# Patient Record
Sex: MALE | Race: WHITE | NOT HISPANIC OR LATINO | Employment: FULL TIME | URBAN - METROPOLITAN AREA
[De-identification: names, ages, dates, MRNs, and addresses within clinical notes are randomized per-mention and may not be internally consistent; named-entity substitution may affect disease eponyms.]

---

## 2017-02-09 ENCOUNTER — OFFICE VISIT - HEALTHEAST (OUTPATIENT)
Dept: INTERNAL MEDICINE | Facility: CLINIC | Age: 52
End: 2017-02-09

## 2017-02-09 DIAGNOSIS — R05.9 COUGH: ICD-10-CM

## 2017-02-09 DIAGNOSIS — Z20.818 PERTUSSIS EXPOSURE: ICD-10-CM

## 2017-03-29 ENCOUNTER — OFFICE VISIT - HEALTHEAST (OUTPATIENT)
Dept: SLEEP MEDICINE | Facility: CLINIC | Age: 52
End: 2017-03-29

## 2017-03-29 DIAGNOSIS — G47.10 HYPERSOMNIA: ICD-10-CM

## 2017-03-29 DIAGNOSIS — G47.33 OSA ON CPAP: ICD-10-CM

## 2017-03-29 ASSESSMENT — MIFFLIN-ST. JEOR: SCORE: 1964.37

## 2017-04-02 ENCOUNTER — COMMUNICATION - HEALTHEAST (OUTPATIENT)
Dept: FAMILY MEDICINE | Facility: CLINIC | Age: 52
End: 2017-04-02

## 2017-04-02 DIAGNOSIS — I42.2 APICAL VARIANT HYPERTROPHIC CARDIOMYOPATHY (H): ICD-10-CM

## 2017-04-06 ENCOUNTER — OFFICE VISIT - HEALTHEAST (OUTPATIENT)
Dept: ALLERGY | Facility: CLINIC | Age: 52
End: 2017-04-06

## 2017-04-06 DIAGNOSIS — R09.81 NASAL CONGESTION: ICD-10-CM

## 2017-06-02 ENCOUNTER — OFFICE VISIT - HEALTHEAST (OUTPATIENT)
Dept: FAMILY MEDICINE | Facility: CLINIC | Age: 52
End: 2017-06-02

## 2017-06-02 DIAGNOSIS — M77.8 RIGHT SHOULDER TENDONITIS: ICD-10-CM

## 2017-06-02 DIAGNOSIS — E66.3 OVERWEIGHT (BMI 25.0-29.9): ICD-10-CM

## 2017-06-02 DIAGNOSIS — I42.2 APICAL VARIANT HYPERTROPHIC CARDIOMYOPATHY (H): ICD-10-CM

## 2017-06-02 DIAGNOSIS — Z00.00 ROUTINE GENERAL MEDICAL EXAMINATION AT A HEALTH CARE FACILITY: ICD-10-CM

## 2017-06-02 DIAGNOSIS — G47.33 OSA (OBSTRUCTIVE SLEEP APNEA): ICD-10-CM

## 2017-06-02 DIAGNOSIS — N52.9 MALE ERECTILE DYSFUNCTION: ICD-10-CM

## 2017-06-02 DIAGNOSIS — E78.5 HYPERLIPIDEMIA: ICD-10-CM

## 2017-06-02 DIAGNOSIS — J06.9 VIRAL UPPER RESPIRATORY ILLNESS: ICD-10-CM

## 2017-06-02 DIAGNOSIS — R74.01 ELEVATED ALT MEASUREMENT: ICD-10-CM

## 2017-06-02 LAB
CHOLEST SERPL-MCNC: 124 MG/DL
FASTING STATUS PATIENT QL REPORTED: YES
HDLC SERPL-MCNC: 44 MG/DL
LDLC SERPL CALC-MCNC: 67 MG/DL
TRIGL SERPL-MCNC: 65 MG/DL

## 2017-06-02 ASSESSMENT — MIFFLIN-ST. JEOR: SCORE: 1828.29

## 2017-06-05 ENCOUNTER — RECORDS - HEALTHEAST (OUTPATIENT)
Dept: ADMINISTRATIVE | Facility: OTHER | Age: 52
End: 2017-06-05

## 2017-09-08 ENCOUNTER — RECORDS - HEALTHEAST (OUTPATIENT)
Dept: ADMINISTRATIVE | Facility: OTHER | Age: 52
End: 2017-09-08

## 2017-09-11 ENCOUNTER — RECORDS - HEALTHEAST (OUTPATIENT)
Dept: ADMINISTRATIVE | Facility: OTHER | Age: 52
End: 2017-09-11

## 2017-09-21 ENCOUNTER — COMMUNICATION - HEALTHEAST (OUTPATIENT)
Dept: FAMILY MEDICINE | Facility: CLINIC | Age: 52
End: 2017-09-21

## 2017-09-26 ENCOUNTER — OFFICE VISIT - HEALTHEAST (OUTPATIENT)
Dept: FAMILY MEDICINE | Facility: CLINIC | Age: 52
End: 2017-09-26

## 2017-09-26 ENCOUNTER — RECORDS - HEALTHEAST (OUTPATIENT)
Dept: GENERAL RADIOLOGY | Facility: CLINIC | Age: 52
End: 2017-09-26

## 2017-09-26 DIAGNOSIS — J98.6 DISORDERS OF DIAPHRAGM: ICD-10-CM

## 2017-09-26 DIAGNOSIS — J98.6 ELEVATED HEMIDIAPHRAGM: ICD-10-CM

## 2017-09-26 DIAGNOSIS — M89.8X1 PAIN OF LEFT SCAPULA: ICD-10-CM

## 2017-09-26 DIAGNOSIS — M89.8X1 OTHER SPECIFIED DISORDERS OF BONE, SHOULDER: ICD-10-CM

## 2017-09-26 DIAGNOSIS — I42.2 APICAL VARIANT HYPERTROPHIC CARDIOMYOPATHY (H): ICD-10-CM

## 2017-09-26 ASSESSMENT — MIFFLIN-ST. JEOR: SCORE: 1720.56

## 2017-10-06 ENCOUNTER — OFFICE VISIT - HEALTHEAST (OUTPATIENT)
Dept: PULMONOLOGY | Facility: OTHER | Age: 52
End: 2017-10-06

## 2017-10-06 DIAGNOSIS — M54.9 MUSCULOSKELETAL BACK PAIN: ICD-10-CM

## 2020-05-11 ENCOUNTER — OFFICE VISIT - HEALTHEAST (OUTPATIENT)
Dept: FAMILY MEDICINE | Facility: CLINIC | Age: 55
End: 2020-05-11

## 2020-05-11 DIAGNOSIS — M10.072 ACUTE IDIOPATHIC GOUT OF LEFT FOOT: ICD-10-CM

## 2020-05-11 RX ORDER — CETIRIZINE HYDROCHLORIDE 10 MG/1
10 TABLET ORAL DAILY
Status: SHIPPED | COMMUNITY
Start: 2020-05-11 | End: 2023-05-11

## 2020-05-26 ENCOUNTER — COMMUNICATION - HEALTHEAST (OUTPATIENT)
Dept: FAMILY MEDICINE | Facility: CLINIC | Age: 55
End: 2020-05-26

## 2020-05-26 ENCOUNTER — AMBULATORY - HEALTHEAST (OUTPATIENT)
Dept: FAMILY MEDICINE | Facility: CLINIC | Age: 55
End: 2020-05-26

## 2020-05-26 DIAGNOSIS — M10.072 ACUTE IDIOPATHIC GOUT OF LEFT FOOT: ICD-10-CM

## 2020-06-02 ENCOUNTER — COMMUNICATION - HEALTHEAST (OUTPATIENT)
Dept: FAMILY MEDICINE | Facility: CLINIC | Age: 55
End: 2020-06-02

## 2020-06-04 ENCOUNTER — OFFICE VISIT - HEALTHEAST (OUTPATIENT)
Dept: FAMILY MEDICINE | Facility: CLINIC | Age: 55
End: 2020-06-04

## 2020-06-04 ENCOUNTER — COMMUNICATION - HEALTHEAST (OUTPATIENT)
Dept: FAMILY MEDICINE | Facility: CLINIC | Age: 55
End: 2020-06-04

## 2020-06-04 DIAGNOSIS — M79.89 SWELLING OF TOE OF LEFT FOOT: ICD-10-CM

## 2020-06-04 DIAGNOSIS — M70.41 PREPATELLAR BURSITIS OF RIGHT KNEE: ICD-10-CM

## 2020-06-04 DIAGNOSIS — Z23 NEED FOR VACCINATION: ICD-10-CM

## 2020-06-04 LAB
APPEARANCE FLD: NORMAL
COLOR FLD: NORMAL
CRYSTALS SNV MICRO: NORMAL
CRYSTALS SNV MICRO: NORMAL
ERYTHROCYTE [DISTWIDTH] IN BLOOD BY AUTOMATED COUNT: 13 % (ref 11–14.5)
HCT VFR BLD AUTO: 47.1 % (ref 40–54)
HGB BLD-MCNC: 15.9 G/DL (ref 14–18)
MCH RBC QN AUTO: 30.5 PG (ref 27–34)
MCHC RBC AUTO-ENTMCNC: 33.7 G/DL (ref 32–36)
MCV RBC AUTO: 90 FL (ref 80–100)
PLATELET # BLD AUTO: 130 THOU/UL (ref 140–440)
PMV BLD AUTO: 8.3 FL (ref 7–10)
RBC # BLD AUTO: 5.2 MILL/UL (ref 4.4–6.2)
RBC FLUID - HISTORICAL: NORMAL
URATE SERPL-MCNC: 7.5 MG/DL (ref 3–8)
WBC # FLD AUTO: NORMAL 10*3/UL
WBC: 5.8 THOU/UL (ref 4–11)

## 2020-06-04 RX ORDER — COLCHICINE 0.6 MG/1
0.6 TABLET ORAL 2 TIMES DAILY
Qty: 60 TABLET | Refills: 2 | Status: SHIPPED | OUTPATIENT
Start: 2020-06-04 | End: 2022-06-27

## 2020-06-07 LAB
BACTERIA SPEC CULT: NO GROWTH
GRAM STAIN RESULT: NORMAL
GRAM STAIN RESULT: NORMAL

## 2020-07-15 ENCOUNTER — OFFICE VISIT - HEALTHEAST (OUTPATIENT)
Dept: FAMILY MEDICINE | Facility: CLINIC | Age: 55
End: 2020-07-15

## 2020-07-15 DIAGNOSIS — B02.9 HERPES ZOSTER WITHOUT COMPLICATION: ICD-10-CM

## 2020-07-15 ASSESSMENT — MIFFLIN-ST. JEOR: SCORE: 1793.14

## 2020-07-20 ENCOUNTER — COMMUNICATION - HEALTHEAST (OUTPATIENT)
Dept: FAMILY MEDICINE | Facility: CLINIC | Age: 55
End: 2020-07-20

## 2020-08-19 ENCOUNTER — OFFICE VISIT - HEALTHEAST (OUTPATIENT)
Dept: FAMILY MEDICINE | Facility: CLINIC | Age: 55
End: 2020-08-19

## 2020-08-19 DIAGNOSIS — G47.33 OSA (OBSTRUCTIVE SLEEP APNEA): ICD-10-CM

## 2020-08-19 DIAGNOSIS — L81.9 ATYPICAL PIGMENTED SKIN LESION: ICD-10-CM

## 2020-08-19 DIAGNOSIS — Z11.4 ENCOUNTER FOR SCREENING FOR HIV: ICD-10-CM

## 2020-08-19 DIAGNOSIS — E78.5 HYPERLIPIDEMIA, UNSPECIFIED HYPERLIPIDEMIA TYPE: ICD-10-CM

## 2020-08-19 DIAGNOSIS — M10.072 ACUTE IDIOPATHIC GOUT OF LEFT FOOT: ICD-10-CM

## 2020-08-19 DIAGNOSIS — R74.01 ELEVATED AST (SGOT): ICD-10-CM

## 2020-08-19 DIAGNOSIS — Z86.19 HISTORY OF HERPES ZOSTER: ICD-10-CM

## 2020-08-19 DIAGNOSIS — Z00.00 ROUTINE GENERAL MEDICAL EXAMINATION AT A HEALTH CARE FACILITY: ICD-10-CM

## 2020-08-19 DIAGNOSIS — E66.3 OVERWEIGHT: ICD-10-CM

## 2020-08-19 DIAGNOSIS — I42.2 APICAL VARIANT HYPERTROPHIC CARDIOMYOPATHY (H): ICD-10-CM

## 2020-08-19 LAB
ALBUMIN SERPL-MCNC: 4.3 G/DL (ref 3.5–5)
ALP SERPL-CCNC: 54 U/L (ref 45–120)
ALT SERPL W P-5'-P-CCNC: 27 U/L (ref 0–45)
ANION GAP SERPL CALCULATED.3IONS-SCNC: 10 MMOL/L (ref 5–18)
AST SERPL W P-5'-P-CCNC: 35 U/L (ref 0–40)
BILIRUB SERPL-MCNC: 0.8 MG/DL (ref 0–1)
BUN SERPL-MCNC: 12 MG/DL (ref 8–22)
CALCIUM SERPL-MCNC: 9.4 MG/DL (ref 8.5–10.5)
CHLORIDE BLD-SCNC: 103 MMOL/L (ref 98–107)
CHOLEST SERPL-MCNC: 188 MG/DL
CO2 SERPL-SCNC: 29 MMOL/L (ref 22–31)
CREAT SERPL-MCNC: 1.18 MG/DL (ref 0.7–1.3)
ERYTHROCYTE [DISTWIDTH] IN BLOOD BY AUTOMATED COUNT: 13.1 % (ref 11–14.5)
FASTING STATUS PATIENT QL REPORTED: YES
GFR SERPL CREATININE-BSD FRML MDRD: >60 ML/MIN/1.73M2
GLUCOSE BLD-MCNC: 85 MG/DL (ref 70–125)
HCT VFR BLD AUTO: 48.5 % (ref 40–54)
HDLC SERPL-MCNC: 51 MG/DL
HGB BLD-MCNC: 16.1 G/DL (ref 14–18)
LDLC SERPL CALC-MCNC: 119 MG/DL
MCH RBC QN AUTO: 30.9 PG (ref 27–34)
MCHC RBC AUTO-ENTMCNC: 33.1 G/DL (ref 32–36)
MCV RBC AUTO: 93 FL (ref 80–100)
PLATELET # BLD AUTO: 133 THOU/UL (ref 140–440)
PMV BLD AUTO: 10.1 FL (ref 7–10)
POTASSIUM BLD-SCNC: 4.5 MMOL/L (ref 3.5–5)
PROT SERPL-MCNC: 6.4 G/DL (ref 6–8)
RBC # BLD AUTO: 5.21 MILL/UL (ref 4.4–6.2)
SODIUM SERPL-SCNC: 142 MMOL/L (ref 136–145)
TRIGL SERPL-MCNC: 91 MG/DL
URATE SERPL-MCNC: 8.8 MG/DL (ref 3–8)
WBC: 4 THOU/UL (ref 4–11)

## 2020-08-19 ASSESSMENT — MIFFLIN-ST. JEOR: SCORE: 1810.15

## 2020-08-20 LAB — HIV 1+2 AB+HIV1 P24 AG SERPL QL IA: NEGATIVE

## 2020-08-24 ENCOUNTER — COMMUNICATION - HEALTHEAST (OUTPATIENT)
Dept: FAMILY MEDICINE | Facility: CLINIC | Age: 55
End: 2020-08-24

## 2020-10-29 ENCOUNTER — AMBULATORY - HEALTHEAST (OUTPATIENT)
Dept: FAMILY MEDICINE | Facility: CLINIC | Age: 55
End: 2020-10-29

## 2020-10-29 ENCOUNTER — VIRTUAL VISIT (OUTPATIENT)
Dept: FAMILY MEDICINE | Facility: OTHER | Age: 55
End: 2020-10-29

## 2020-10-29 DIAGNOSIS — Z20.822 SUSPECTED 2019 NOVEL CORONAVIRUS INFECTION: ICD-10-CM

## 2020-10-29 NOTE — PROGRESS NOTES
"Date: 10/29/2020 09:28:28  Clinician: Angélica Angel  Clinician NPI: [id number]  Patient: Gagan Mejía  Patient : 1965  Patient Address: [address] [place] [address]  Patient Phone: [phone]  Visit Protocol: URI  Patient Summary:  Gagan is a 55 year old ( : 1965 ) male who initiated a [facility] Visit for COVID-19 (Coronavirus) evaluation and screening. When asked the question \"Please sign me up to receive news, health information and promotions. \", Gagan responded \"No\".    Gagan states his symptoms started 1-2 days ago.   His symptoms consist of a headache, a cough, nasal congestion, myalgia, malaise, a sore throat, and rhinitis.   Symptom details     Nasal secretions: The color of his mucus is clear.    Cough: Gagan coughs a few times an hour and his cough is not more bothersome at night. Phlegm does not come into his throat when he coughs. He believes his cough is caused by post-nasal drip.     Sore throat: Gagan reports having mild throat pain (1-3 on a 10 point pain scale), does not have exudate on his tonsils, and can swallow liquids. He is not sure if the lymph nodes in his neck are enlarged. A rash has not appeared on the skin since the sore throat started.     Headache: He states the headache is mild (1-3 on a 10 point pain scale).      Gagan denies having ear pain, wheezing, fever, nausea, facial pain or pressure, chills, teeth pain, ageusia, diarrhea, anosmia, and vomiting. He also denies having recent facial or sinus surgery in the past 60 days and taking antibiotic medication in the past month. He is not experiencing dyspnea.   Precipitating events  Gagan is not sure if he has been exposed to someone with strep throat. He has not recently been exposed to someone with influenza. Gagan has not been in close contact with any high risk individuals.   Pertinent COVID-19 (Coronavirus) information  Gagan does not work or volunteer as healthcare worker or a first " responder. In the past 14 days, Gagan has not worked or volunteered at a healthcare facility or group living setting.   In the past 14 days, he also has not lived in a congregate living setting.   Gagan has not had a close contact with a laboratory-confirmed COVID-19 patient within 14 days of symptom onset.    Since December 2019, Gagan has not been diagnosed with lab-confirmed COVID-19 test and has not had upper respiratory infection or influenza-like illness.   Pertinent medical history  Gagan does not need a return to work/school note.   Weight: 205 lbs   Gagan does not smoke or use smokeless tobacco.   Weight: 205 lbs    MEDICATIONS: No current medications, ALLERGIES: Penicillins  Clinician Response:  Dear Gagan,   Your symptoms show that you may have coronavirus (COVID-19). This illness can cause fever, cough and trouble breathing. Many people get a mild case and get better on their own. Some people can get very sick.  What should I do?  We would like to test you for this virus.   1. Please call [phone] to schedule your visit. Explain that you were referred by [facility] to have a COVID-19 test. Be ready to share your [facility] visit ID number.  The following will serve as your written order for this COVID Test, ordered by me, for the indication of suspected COVID [Z20.828]: The test will be ordered in [employer], our electronic health record, after you are scheduled. It will show as ordered and authorized by Jonny Huggins MD.  Order: COVID-19 (Coronavirus) PCR for SYMPTOMATIC testing from [facility].   2. When it's time for your COVID test:  Stay at least 6 feet away from others. (If someone will drive you to your test, stay in the backseat, as far away from the  as you can.)   Cover your mouth and nose with a mask, tissue or washcloth.  Go straight to the testing site. Don't make any stops on the way there or back.      3.Starting now: Stay home and away from others (self-isolate) until:   You've had no  "fever---and no medicine that reduces fever---for one full day (24 hours). And...   Your other symptoms have gotten better. For example, your cough or breathing has improved. And...   At least 10 days have passed since your symptoms started.       During this time, don't leave the house except for testing or medical care.   Stay in your own room, even for meals. Use your own bathroom if you can.   Stay away from others in your home. No hugging, kissing or shaking hands. No visitors.  Don't go to work, school or anywhere else.    Clean \"high touch\" surfaces often (doorknobs, counters, handles, etc.). Use a household cleaning spray or wipes. You'll find a full list of  on the EPA website: www.epa.gov/pesticide-registration/list-n-disinfectants-use-against-sars-cov-2.   Cover your mouth and nose with a mask, tissue or washcloth to avoid spreading germs.  Wash your hands and face often. Use soap and water.  Caregivers in these groups are at risk for severe illness due to COVID-19:  o People 65 years and older  o People who live in a nursing home or long-term care facility  o People with chronic disease (lung, heart, cancer, diabetes, kidney, liver, immunologic)  o People who have a weakened immune system, including those who:   Are in cancer treatment  Take medicine that weakens the immune system, such as corticosteroids  Had a bone marrow or organ transplant  Have an immune deficiency  Have poorly controlled HIV or AIDS  Are obese (body mass index of 40 or higher)  Smoke regularly   o Caregivers should wear gloves while washing dishes, handling laundry and cleaning bedrooms and bathrooms.  o Use caution when washing and drying laundry: Don't shake dirty laundry, and use the warmest water setting that you can.  o For more tips, go to www.cdc.gov/coronavirus/2019-ncov/downloads/10Things.pdf.    4.Sign up for [employer] Loop. We know it's scary to hear that you might have COVID-19. We want to track your symptoms to " make sure you're okay over the next 2 weeks. Please look for an email from [employer]---this is a free, online program that we'll use to keep in touch. To sign up, follow the link in the email. Learn more at [email or web address]  How can I take care of myself?   Get lots of rest. Drink extra fluids (unless a doctor has told you not to).   Take Tylenol (acetaminophen) for fever or pain. If you have liver or kidney problems, ask your family doctor if it's okay to take Tylenol.   Adults can take either:    650 mg (two 325 mg pills) every 4 to 6 hours, or...   1,000 mg (two 500 mg pills) every 8 hours as needed.    Note: Don't take more than 3,000 mg in one day. Acetaminophen is found in many medicines (both prescribed and over-the-counter medicines). Read all labels to be sure you don't take too much.   For children, check the Tylenol bottle for the right dose. The dose is based on the child's age or weight.    If you have other health problems (like cancer, heart failure, an organ transplant or severe kidney disease): Call your specialty clinic if you don't feel better in the next 2 days.       Know when to call 911. Emergency warning signs include:    Trouble breathing or shortness of breath Pain or pressure in the chest that doesn't go away Feeling confused like you haven't felt before, or not being able to wake up Bluish-colored lips or face.  Where can I get more information?   [facility] -- About COVID-19: [email or web address]   CDC -- What to Do If You're Sick: www.cdc.gov/coronavirus/2019-ncov/about/steps-when-sick.html   CDC -- Ending Home Isolation: www.cdc.gov/coronavirus/2019-ncov/hcp/disposition-in-home-patients.html   CDC -- Caring for Someone: www.cdc.gov/coronavirus/2019-ncov/if-you-are-sick/care-for-someone.html   [facility] -- Interim Guidance for Hospital Discharge to Home: www.health.[facility].[place]/diseases/coronavirus/hcp/hospdischarge.pdf   [facility]  trials (COVID-19 research studies): clinicalaffairs.[facility]/n-clinical-trials    Below are the COVID-19 hotlines at the [place] Department of Health ([facility]). Interpreters are available.    For health questions: Call 422-652-7772 or 1-452.125.6107 (7 a.m. to 7 p.m.) For questions about schools and childcare: Call 099-078-0492 or 1-346.744.5859 (7 a.m. to 7 p.m.)    Diagnosis: Contact with and (suspected) exposure to other viral communicable diseases  Diagnosis ICD: Z20.828

## 2020-10-30 ENCOUNTER — COMMUNICATION - HEALTHEAST (OUTPATIENT)
Dept: SCHEDULING | Facility: CLINIC | Age: 55
End: 2020-10-30

## 2020-10-31 ENCOUNTER — NURSE TRIAGE (OUTPATIENT)
Dept: NURSING | Facility: CLINIC | Age: 55
End: 2020-10-31

## 2020-10-31 ENCOUNTER — COMMUNICATION - HEALTHEAST (OUTPATIENT)
Dept: LAB | Facility: CLINIC | Age: 55
End: 2020-10-31

## 2020-10-31 NOTE — TELEPHONE ENCOUNTER
"Coronavirus (COVID-19) Notification    Caller Name (Patient, parent, daughter/son, grandparent, etc)  Patient-Beto    Reason for call  Notify of Positive Coronavirus (COVID-19) lab results, assess symptoms,  review [facility] recommendations    Lab Result    Lab test:  2019-nCoV rRt-PCR or SARS-CoV-2 PCR    Oropharyngeal AND/OR nasopharyngeal swabs is POSITIVE for 2019-nCoV RNA/SARS-COV-2 PCR (COVID-19 virus)    RN Recommendations/Instructions per [facility] Coronavirus COVID-19 recommendations    Brief introduction script  Introduce self then review script:  \"I am calling on behalf of [employer].  We were notified that your Coronavirus test (COVID-19) for was POSITIVE for the virus.  I have some information to relay to you but first I wanted to mention that the [place] Dept of Health will be contacting you shortly [it's possible MD already called Patient] to talk to you more about how you are feeling and other people you have had contact with who might now also have the virus.  Also, [facility] is Partnering with the [facility] for Covid-19 research, you may be contacted directly by research staff.\"    Assessment (Inquire about Patient's current symptoms)   Assessment   Current Symptoms at time of phone call: (if no symptoms, document No symptoms] Mild cough and loss of taste, fatigue, headache   Symptoms onset (if applicable) 10/27     If at time of call, Patients symptoms hare worsened, the Patient should contact 911 or have someone drive them to Emergency Dept promptly:      If Patient calling 911, inform 911 personal that you have tested positive for the Coronavirus (COVID-19).  Place mask on and await 911 to arrive.    If Emergency Dept, If possible, please have another adult drive you to the Emergency Dept but you need to wear mask when in contact with other people.      Review information with Patient    Your result was positive. This means you have COVID-19 (coronavirus).  We " have sent you a letter that reviews the information that I'll be reviewing with you now.    How can I protect others?    If you have symptoms: stay home and away from others (self-isolate) until:    You've had no fever--and no medicine that reduces fever--for 1 full day (24 hours). And       Your other symptoms have gotten better. For example, your cough or breathing has improved. And     At least 10 days have passed since your symptoms started. (If you've been told by a doctor that you have a weak immune system, wait 20 days.)     If you don't have symptoms: Stay home and away from others (self-isolate) until at least 10 days have passed since your first positive COVID-19 test. (Date test collected)    During this time:    Stay in your own room, including for meals. Use your own bathroom if you can.    Stay away from others in your home. No hugging, kissing or shaking hands. No visitors.     Don't go to work, school or anywhere else.     Clean  high touch  surfaces often (doorknobs, counters, handles, etc.). Use a household cleaning spray or wipes. You'll find a full list on the EPA website at www.epa.gov/pesticide-registration/list-n-disinfectants-use-against-sars-cov-2.     Cover your mouth and nose with a mask, tissue or other face covering to avoid spreading germs.    Wash your hands and face often with soap and water.    Caregivers in these groups are at risk for severe illness due to COVID-19:  o People 65 years and older  o People who live in a nursing home or long-term care facility  o People with chronic disease (lung, heart, cancer, diabetes, kidney, liver, immunologic)  o People who have a weakened immune system, including those who:  - Are in cancer treatment  - Take medicine that weakens the immune system, such as corticosteroids  - Had a bone marrow or organ transplant  - Have an immune deficiency  - Have poorly controlled HIV or AIDS  - Are obese (body mass index of 40 or higher)  - Smoke  regularly    Caregivers should wear gloves while washing dishes, handling laundry and cleaning bedrooms and bathrooms.    Wash and dry laundry with special caution. Don't shake dirty laundry, and use the warmest water setting you can.    If you have a weakened immune system, ask your doctor about other actions you should take.    For more tips, go to www.cdc.gov/coronavirus/2019-ncov/downloads/10Things.pdf.    You should not go back to work until you meet the guidelines above for ending your home isolation. You don't need to be retested for COVID-19 before going back to work--studies show that you won't spread the virus if it's been at least 10 days since your symptoms started (or 20 days, if you have a weak immune system).    Employers: This document serves as formal notice of your employee's medical guidelines for going back to work. They must meet the above guidelines before going back to work in person.    How can I take care of myself?    1. Get lots of rest. Drink extra fluids (unless a doctor has told you not to).    2. Take Tylenol (acetaminophen) for fever or pain. If you have liver or kidney problems, ask your family doctor if it's okay to take Tylenol.     Take either:     650 mg (two 325 mg pills) every 4 to 6 hours, or     1,000 mg (two 500 mg pills) every 8 hours as needed.     Note: Don't take more than 3,000 mg in one day. Acetaminophen is found in many medicines (both prescribed and over-the-counter medicines). Read all labels to be sure you don't take too much.    For children, check the Tylenol bottle for the right dose (based on their age or weight).    3. If you have other health problems (like cancer, heart failure, an organ transplant or severe kidney disease): Call your specialty clinic if you don't feel better in the next 2 days.    4. Know when to call 911: Emergency warning signs include:    Trouble breathing or shortness of breath    Pain or pressure in the chest that doesn't go  away    Feeling confused like you haven't felt before, or not being able to wake up    Bluish-colored lips or face    5. Sign up for [employer]. We know it's scary to hear that you have COVID-19. We want to track your symptoms to make sure you're okay over the next 2 weeks. Please look for an email from [employer]--this is a free, online program that we'll use to keep in touch. To sign up, follow the link in the email. Learn more at [email or web address].    Where can I get more information?    [facility]: [email or web address]    Coronavirus Basics: www.health.[facility].[place]./diseases/coronavirus/basics.html    What to Do If You're Sick: www.cdc.gov/coronavirus/2019-ncov/about/steps-when-sick.html    Ending Home Isolation: www.cdc.gov/coronavirus/2019-ncov/hcp/disposition-in-home-patients.html     Caring for Someone with COVID-19: www.cdc.gov/coronavirus/2019-ncov/if-you-are-sick/care-for-someone.html     [facility] clinical trials (COVID-19 research studies): clinicalaffairs.[facility]/n-clinical-trials     A Positive COVID-19 letter will be sent via [employer] or the mail. (Exception, no letters sent to Presurgerical/Preprocedure Patients)    [Name]  Leyda Richey RN

## 2021-03-25 ENCOUNTER — AMBULATORY - HEALTHEAST (OUTPATIENT)
Dept: NURSING | Facility: CLINIC | Age: 56
End: 2021-03-25

## 2021-04-19 ENCOUNTER — AMBULATORY - HEALTHEAST (OUTPATIENT)
Dept: NURSING | Facility: CLINIC | Age: 56
End: 2021-04-19

## 2021-05-29 ENCOUNTER — RECORDS - HEALTHEAST (OUTPATIENT)
Dept: ADMINISTRATIVE | Facility: CLINIC | Age: 56
End: 2021-05-29

## 2021-05-30 VITALS — HEIGHT: 71 IN | WEIGHT: 243 LBS | BODY MASS INDEX: 34.02 KG/M2

## 2021-05-30 VITALS — BODY MASS INDEX: 33.11 KG/M2 | WEIGHT: 237.4 LBS

## 2021-05-31 VITALS — HEIGHT: 71 IN | WEIGHT: 191 LBS | BODY MASS INDEX: 26.74 KG/M2

## 2021-05-31 VITALS — WEIGHT: 189 LBS | BODY MASS INDEX: 26.74 KG/M2

## 2021-05-31 VITALS — WEIGHT: 213 LBS | HEIGHT: 71 IN | BODY MASS INDEX: 29.82 KG/M2

## 2021-06-02 ENCOUNTER — RECORDS - HEALTHEAST (OUTPATIENT)
Dept: ADMINISTRATIVE | Facility: CLINIC | Age: 56
End: 2021-06-02

## 2021-06-04 VITALS
HEART RATE: 64 BPM | SYSTOLIC BLOOD PRESSURE: 150 MMHG | BODY MASS INDEX: 28.98 KG/M2 | HEIGHT: 71 IN | RESPIRATION RATE: 12 BRPM | DIASTOLIC BLOOD PRESSURE: 79 MMHG | TEMPERATURE: 98.1 F | WEIGHT: 207 LBS

## 2021-06-04 VITALS
SYSTOLIC BLOOD PRESSURE: 110 MMHG | BODY MASS INDEX: 29.26 KG/M2 | HEART RATE: 69 BPM | HEIGHT: 71 IN | WEIGHT: 209 LBS | OXYGEN SATURATION: 98 % | TEMPERATURE: 96.5 F | DIASTOLIC BLOOD PRESSURE: 80 MMHG

## 2021-06-04 VITALS
TEMPERATURE: 98 F | DIASTOLIC BLOOD PRESSURE: 70 MMHG | OXYGEN SATURATION: 98 % | SYSTOLIC BLOOD PRESSURE: 110 MMHG | BODY MASS INDEX: 28.29 KG/M2 | WEIGHT: 200 LBS

## 2021-06-08 NOTE — PROGRESS NOTES
[facility] Clinic Visit  Patient Name: Gagan Mejía  Patient Age: 51 y.o.  YOB: 1965  MRN: [id number]  ?  Date of Visit: 2/9/2017  Reason for Office Visit:   Chief Complaint   Patient presents with     Cough     mild cough this am, exposed to whooping cough     runny nose     and sneezing for last 2 days, afebrile- felt warm on tuesday       HPI: Gagan Mejía 51 y.o. who presents to clinic for mild dry cough, sneezing, rhinitis. Tactile fever the other day but did not record a temp. Started with vitamin C, no other OTC medications. No SOB, chest pain, SABILLON.        Review of Systems: As noted in HPI     Current Scheduled Meds:  Outpatient Encounter Prescriptions as of 2/9/2017   Medication Sig Dispense Refill     cetirizine (ZYRTEC) 10 MG tablet Take 10 mg by mouth daily.       metoprolol tartrate (LOPRESSOR) 50 MG tablet TAKE 1 TABLET(50 MG) BY MOUTH TWICE DAILY 180 tablet 1     multivitamin (ONE A DAY) per tablet Take 1 tablet by mouth daily.       azithromycin (ZITHROMAX Z-ESTIVEN) 250 MG tablet Take 2 tablets (500 mg) on  Day 1,  followed by 1 tablet (250 mg) once daily on Days 2 through 5. 6 tablet 0     No facility-administered encounter medications on file as of 2/9/2017.      Past Medical History:   Diagnosis Date     Apical variant hypertrophic cardiomyopathy 4/3/2016     JENNIFER on CPAP, mild to moderate per patient 11/2015     Rotator cuff tear, right      Past Surgical History:   Procedure Laterality Date     KNEE ARTHROSCOPY       LASIK       ROTATOR CUFF REPAIR Right      Social History   Substance Use Topics     Smoking status: Never Smoker     Smokeless tobacco: Never Used     Alcohol use Yes      Comment: 1-2 times per week       Objective / Physical Examination:  Visit Vitals     /64     Pulse 64     Temp 97.4  F (36.3  C) (Oral)     Wt (!) 237 lb 6.4 oz (107.7 kg)     SpO2 97%     BMI 33.11 kg/m2     Wt Readings from Last 3 Encounters:   02/09/17 (!) 237 lb 6.4 oz (107.7 kg)    10/19/16 (!) 240 lb (108.9 kg)   07/21/16 (!) 235 lb (106.6 kg)     Body mass index is 33.11 kg/(m^2). (>25?)    General Appearance: Alert and oriented in no acute distress  Neck: Supple, trachea midline. No cervical adenopathy.  Lungs: Clear to auscultation bilaterally. Normal inspiratory and expiratory effort. No w/r/r  Cardiovascular: RRR S1, S2. No m/r/g  Integumentary: Warm and dry  Neuro: Alert and oriented, follows commands appropriately    Assessment / Plan / Medical Decision Making:      Encounter Diagnoses   Name Primary?     Cough Yes     Pertussis exposure         1. Cough  2. Pertussis exposure    rx for post exposure prophylaxis which is same as treatment for pertussis. Recommend he stay home from work tomorrow and may return Monday if feeling ok. Continue to wear a mask and avoid close contact with family members for next 4-5 days. Return if symptoms not improving or worsening    - azithromycin (ZITHROMAX Z-ESTIVEN) 250 MG tablet; Take 2 tablets (500 mg) on  Day 1,  followed by 1 tablet (250 mg) once daily on Days 2 through 5.  Dispense: 6 tablet; Refill: 0    Kiran Nur MD  [facility]

## 2021-06-08 NOTE — PROGRESS NOTES
Left great toe IP Joint Aspiration/Injection    Date/Time: 6/4/2020 10:50 AM  Performed by: Luciano Nicholas MD  Authorized by: Luciano Nicholas MD       Universal Protocol    Site marked: Yes    Prior images obtained and reviewed: Yes    Required items: required blood products, implants, devices, and special equipment available    Patient identity confirmed: verbally with patient    Reevaluation: Patient was reevaluated immediately before administering moderate or deep sedation or anesthesia    Confirmation checklist: patient's identity using two indicators    Time out: Immediately prior to procedure a time out was called to verify the correct patient, procedure, equipment, support staff and site/side marked as required    Universal Protocol: Joint Commission Universal Protocol was followed    Preparation: Patient was prepped and draped in the usual sterile fashion    Indications  Indications: joint swelling     Location  Body area: toe  Joint: left big IP      Anesthesia    Local anesthesia used?: Yes    Anesthesia: local infiltration    Local anesthetic: lidocaine 1% without epinephrine    Anesthetic total (mL): 1    Sedation  Patient sedation: No    Procedure Details  Needle size: 22 G  Ultrasound guidance: no  Approach: anterior  Aspirate amount: 0.5 mL  Methylprednisolone amount: 20 mg      Post-procedure    Patient tolerance: Patient tolerated the procedure well with no immediate complications   Length of time physician present for 1:1 monitoring during sedation: 0

## 2021-06-08 NOTE — PROGRESS NOTES
"Gagna Mejía is a 55 y.o. male who is being evaluated via a billable video visit.      The patient has been notified of following:     \"This video visit will be conducted via a call between you and your physician/provider. We have found that certain health care needs can be provided without the need for an in-person physical exam.  This service lets us provide the care you need with a video conversation.  If a prescription is necessary we can send it directly to your pharmacy.  If lab work is needed we can place an order for that and you can then stop by our lab to have the test done at a later time.    Video visits are billed at different rates depending on your insurance coverage. Please reach out to your insurance provider with any questions.    If during the course of the call the physician/provider feels a video visit is not appropriate, you will not be charged for this service.\"    Patient has given verbal consent to a Video visit? Yes    Patient would like to receive their AVS by AVS Preference: Kenny.    Patient would like the video invitation sent by: Text to cell phone: [phone]    Will anyone else be joining your video visit? 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 Rossi); right shoulder arthroscopy 3/31/16 (Dr. Bales) for subacromial decompression and partial thickness tear repair of supraspinatus)  Pneumonia LLL - 2005     CPAP (variable) ~ 8 (starts at 5 and goes up to 8-10)    Dr. Dorantes ([place]), cardiologist  Dr. Bales, [facility]    Video Start Time: 4:01 PM    Additional provider notes: GENERAL: Healthy, alert and no distress  EYES: Eyes grossly normal to inspection. No " discharge or erythema, or obvious scleral/conjunctival abnormalities.  RESP: No audible wheeze, cough, or visible cyanosis.  No visible retractions or increased work of breathing.    NEURO: Cranial nerves grossly intact. Mentation and speech appropriate for age.  PSYCH: Mentation appears normal, affect normal/bright, judgement and insight intact, normal speech and appearance well-groomed  left great toe IP joint inflammation and redness without drainage    Video visit completed today.  Left great toe pain.  Noted last Thursday.  Involving the IP joint of great toe.  No injury or trauma.  No history of gout previously or chronic gout concerns.  Brother however has gout.  Neighbor also.  Patient did get 3 prednisone tablets from his neighbor.  Took 2 initially then 1 the following day.  Felt better.  Symptoms however lingering.  No significant dietary indiscretions regarding red meat, high fructose corn syrup, seafood, alcohol consumption etc.  Overdue for physical exam which was last performed June 2, 2017.  Patient without concerns for side effects with prednisone including insomnia, increased appetite etc.    1. Acute idiopathic gout of left foot  Idiopathic gout left foot IP joint involvement.  Prednisone 20 mg twice daily x5 days.  Will check uric acid level, CBC and renal function at follow-up physical exam the summer with uric acid goal less than 6.0 ideally.  Gout diet to be followed.  - predniSONE (DELTASONE) 20 MG tablet; Take 20 mg by mouth 2 (two) times a day for 5 days.  Dispense: 10 tablet; Refill: 2       Video-Visit Details    Type of service:  Video Visit    Video End Time (time video stopped): 4:13 PM  Originating Location (pt. Location): Home    Distant Location (provider location):  [facility]     Platform used for Video Visit: Annette Nicholas MD

## 2021-06-08 NOTE — PROGRESS NOTES
"Assessment/Plan:    1. Swelling of toe of left foot  Left great toe IP joint swelling.  Joint aspiration consistent with acute gouty arthritis with thick white fluid drainage.  Sent for exam.  CBC and uric acid level obtained.  Will remain off prednisone.  Colchicine 0.6 mg twice daily.  - HM2(CBC w/o Differential)  - Uric Acid  - HML Joint Fluid Exam  - Culture/Gram Stain: Joint  - Joint Fluid Exam  - Glucose, Body Fluid  - Protein, Body Fluid  - methylPREDNISolone acetate injection 20 mg (DEPO-MEDROL)  - colchicine 0.6 mg tablet; Take 1 tablet (0.6 mg total) by mouth 2 (two) times a day.  Dispense: 60 tablet; Refill: 2    2.  Right knee prepatellar bursitis  Limits of exacerbating triggers.  Avoid kneeling activities.  Anticipate self-limited.    3.  Immunizations  History of Adacel booster July 2, 2009.  tetanus booster provided.      Subjective:    Gagan Mejía is seen today for left great toe pain.  Swelling.  Symptoms since around May 7, 2020.  Had margaritas on May 5.  Had shrimp on May 6.  Drinks bloody [employer].  Occasional beer but less recently.  Was sitting at his desk for about 16 hours around that time.  Brother Dimitri has history of gout.  No fevers.  No trauma.  Right knee pain at times when he kneels otherwise unable to reproduce with direct pressure otherwise.  Needs a tetanus booster.  Comprehensive review of systems as above otherwise all negative.     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 Rossi); right shoulder arthroscopy 3/31/16 (Dr. Bales) for subacromial decompression and partial thickness tear repair of supraspinatus)  Pneumonia LLL - 2005     CPAP (variable) ~ 8 (starts at 5 and goes " up to 8-10)    Dr. Dorantes ([place]), cardiologist  Dr. Bales, [facility]    Past Surgical History:   Procedure Laterality Date     KNEE ARTHROSCOPY       LASIK       ROTATOR CUFF REPAIR Right         Family History   Problem Relation Age of Onset     No Medical Problems Mother      Fainting Father      No Medical Problems Son      Sleep apnea Brother      No Medical Problems Brother      No Medical Problems Brother      No Medical Problems Sister      No Medical Problems Sister         Past Medical History:   Diagnosis Date     Apical variant hypertrophic cardiomyopathy (H) 4/3/2016     JENNIFER on CPAP, mild to moderate per patient 11/2015     Rotator cuff tear, right         Social History     Tobacco Use     Smoking status: Never Smoker     Smokeless tobacco: Never Used   Substance Use Topics     Alcohol use: Yes     Comment: 1-2 times per week     Drug use: No        Current Outpatient Medications   Medication Sig Dispense Refill     cetirizine (ZYRTEC) 10 MG tablet Take 10 mg by mouth daily.       multivitamin (ONE A DAY) per tablet Take 1 tablet by mouth daily.       colchicine 0.6 mg tablet Take 1 tablet (0.6 mg total) by mouth 2 (two) times a day. 60 tablet 2     No current facility-administered medications for this visit.           Objective:    Vitals:    06/04/20 1003   BP: 110/70   Temp: 98  F (36.7  C)   SpO2: 98%   Weight: 200 lb (90.7 kg)      Body mass index is 28.29 kg/m .    Alert.  No apparent distress at rest.  Left great toe with IP joint swelling more dorsal aspect with some desquamation of skin a little bit more proximal however not significantly red or hot at this time.  IP joint able to be articulated without significant tenderness.  Please see procedure note for aspiration of joint with Depo-Medrol 20 mg injected following fluid collection for joint fluid analysis.  Patient did get somewhat lightheaded following procedure better with recumbent position and was fine prior to clinic  discharge.      This note has been dictated using voice recognition software and as a result may contain minor grammatical errors and unintended word substitutions.

## 2021-06-09 NOTE — PROGRESS NOTES
Assessment/Plan:     Problem List Items Addressed This Visit     Herpes zoster without complication - Primary     With skin outbreak starting in the past 48 hours, will start antiretroviral along with steroids.  If pain worsens then will look at 25 to 50 mg of Elavil at night for 30 days.  Also recommended patient follow PCP advice and get shingles vaccine but due to current outbreak will need to wait 2 months per package/vaccine recommendations         Relevant Medications    valACYclovir (VALTREX) 1000 MG tablet    predniSONE (DELTASONE) 20 MG tablet        Return in about 2 months (around 9/15/2020) for Annual physical.    Subjective:   55 y.o. male presents for burning rash on back and side.  Patient stated about 2 days ago he started feeling tingling and some discomfort on the upper left side of back that wrapped around towards the flank.  No fevers or chills.  And then yesterday started developed some redness around the same location and today there are some bumps on it.  Hurts to touch the area.  If he leaves it alone he feels okay unless he moves in certain directions and then he gets a electrical-like pain.  Did have chickenpox as a kid.  He was recommended for the shingles vaccine during his physical last year but he declined at that time and was thinking about getting it this year.        Review of Systems   Constitutional: Negative for chills, fatigue and fever.   Eyes: Negative for photophobia and visual disturbance.   Respiratory: Negative for cough, choking, shortness of breath and wheezing.    Cardiovascular: Negative for chest pain.   Gastrointestinal: Negative for abdominal pain.   Skin: Positive for rash.   Neurological: Negative for dizziness, weakness, numbness and headaches.   Psychiatric/Behavioral: Negative for sleep disturbance.        History     Reviewed By Date/Time Sections Reviewed    Arnold Muñoz DO 7/15/2020  4:08 PM Medical, Surgical, Tobacco, Family, Socioeconomic     "Christoph Mcleod , [facility] 7/15/2020  3:51 PM Tobacco           Objective:     Vitals:    07/15/20 1548   BP: 150/79   Pulse: 64   Resp: 12   Temp: 98.1  F (36.7  C)   TempSrc: Oral   Weight: 207 lb (93.9 kg)   Height: 5' 10.5\" (1.791 m)     Physical Exam  Vitals signs reviewed.   Constitutional:       General: He is not in acute distress.     Appearance: Normal appearance.   HENT:      Head: Normocephalic and atraumatic.   Cardiovascular:      Rate and Rhythm: Normal rate and regular rhythm.      Pulses: Normal pulses.      Heart sounds: Normal heart sounds.   Pulmonary:      Effort: Pulmonary effort is normal.      Breath sounds: Normal breath sounds.   Musculoskeletal:      Right lower leg: No edema.      Left lower leg: No edema.   Skin:     Capillary Refill: Capillary refill takes less than 2 seconds.      Comments: Upper left back in the T6/7 dermatome distribution to the left with change in sensation when compared to right.  Also 2 patches of erythema with small fluid-filled vesicles about 2 cm from midline as well as an area in the mid axillary line in the same dermatome   Neurological:      Mental Status: He is alert and oriented to person, place, and time.   Psychiatric:         Mood and Affect: Mood normal.         Thought Content: Thought content normal.         This note has been dictated using voice recognition software. Any grammatical or context distortions are unintentional and inherent to the software      "

## 2021-06-09 NOTE — PROGRESS NOTES
Dear Dr. Luciano Nicholas MD  [address]  [place] [address],    Thank you for the opportunity to participate in the care of Gagan Mejía.     He is a 52 y.o. y/o male patient who comes to the sleep medicine clinic for follow up.    He was diagnosed with mild JENNIFER (AHI=12)  and has been using CPAP in auto mode.    Current pressures 5 to 15 cwp.    His symptoms are improved since he started using CPAP. He is not snoring with the device. He denies any PAP intolerance. He is using the device every night and tolerates the pressure well.     Residual AHI: 0.7  Leak: 0  Compliance: 97%  95th P: 9.9    Mask Tolerance: excellent (P10)  Skin irritation: no    His ESS is 16 today. The patient thinks that some his sleepiness is related to short sleep hours. The patient is averaging 6.5 hours of sleep every night.    Past Medical History:   Diagnosis Date     Apical variant hypertrophic cardiomyopathy 4/3/2016     JENNIFER on CPAP, mild to moderate per patient 11/2015     Rotator cuff tear, right        Past Surgical History:   Procedure Laterality Date     KNEE ARTHROSCOPY       LASIK       ROTATOR CUFF REPAIR Right        Social History     Social History     Marital status:      Spouse name: N/A     Number of children: 1     Years of education: N/A     Occupational History           Social History Main Topics     Smoking status: Never Smoker     Smokeless tobacco: Never Used     Alcohol use Yes      Comment: 1-2 times per week     Drug use: No     Sexual activity: Not on file     Other Topics Concern     Not on file     Social History Narrative       Review of Systems:  General: No weight gain, no weight loss  Eyes: No vision changes  ENT: No hearing changes  Cardio: No chest pain, no nocturnal dyspnea  Respiratory: No shortness of breath, no cough  Gastrointestinal: No diarrhea, no constipation  Genitourinary: No excessive urination  Tegumentary: No rashes  Neurological: No seizures, no  "loss of consciousness  Endo: No heat or cold intolerance.    Current Outpatient Prescriptions   Medication Sig Dispense Refill     cetirizine (ZYRTEC) 10 MG tablet Take 10 mg by mouth daily.       metoprolol tartrate (LOPRESSOR) 50 MG tablet TAKE 1 TABLET(50 MG) BY MOUTH TWICE DAILY 180 tablet 1     multivitamin (ONE A DAY) per tablet Take 1 tablet by mouth daily.       No current facility-administered medications for this visit.        Allergies   Allergen Reactions     Penicillins Hives       Physical Exam:  /72 (Patient Site: Right Arm, Patient Position: Sitting, Cuff Size: Adult Large)  Pulse 60  Resp 16  Ht 5' 11\" (1.803 m)  Wt (!) 243 lb (110.2 kg)  SpO2 97%  BMI 33.89 kg/m2  BMI:Body mass index is 33.89 kg/(m^2).   GEN: NAD, obese  Head: Normocephalic.  EYES: PERRLA, EOMI  ENT: Oropharynx is clear, mallampatti class IV airway.   Uvula is not edemtous  Nasal mucosa is pink, with prominent turbinates (right worse than left).  Neurological: Alert, oriented to time, place, and person.  Psych: normal mood, normal affect     Labs/Studies:     I reviewed the efficacy and compliance report from his device. Data summarized on the HPI and the CPAP compliance flow sheet.     Lab Results   Component Value Date    WBC 9.6 03/31/2016    HGB 15.8 03/31/2016    HCT 48.0 03/31/2016    MCV 89 03/31/2016     03/31/2016         Chemistry        Component Value Date/Time     04/03/2016 0604    K 4.2 04/03/2016 0604     04/03/2016 0604    CO2 26 04/03/2016 0604    BUN 14 05/19/2016 1059    CREATININE 1.06 05/19/2016 1059     04/03/2016 0604        Component Value Date/Time    CALCIUM 9.1 05/19/2016 1059    ALKPHOS 71 03/31/2016 1845    AST 37 03/31/2016 1845    ALT 47 (H) 03/31/2016 1845    BILITOT 0.4 03/31/2016 1845          Assessment and Plan:  In summary Gagan Mejía is a 52 y.o. year old male who is here for follow up.    1. Obstructive Sleep Apnea  Compliance and efficacy are " excellent.  I will renew his supplies.   I changed his device to a narrower pressure range (6-10 cwp)  We counseled the patient on the importannce of using his CPAP device every night and the risks of not treating sleep apnea.      2- Hypersomnia.  Recommend considering Allegra for his allergies.  He would benefit from an Allergy consultation.    Patient verbalized understanding of these issues, agrees with the plan and all questions were answered today. Patient was given an opportuntity to voice any other symptoms or concerns not listed above. Patient did not have any other symptoms or concerns.      Patient told to return in 12-24 months. Patient instructed to stop at  to schedule appointment before leaving today.    Frederick Yoo MD  [facility] Board Certified in Internal Medicine and Sleep Medicine  [facility].    We spent a total of 25 minutes of face-to-face encounter and more than 50% of the encounter was used for counseling or coordination of care.

## 2021-06-09 NOTE — PROGRESS NOTES
Chief complaint: Allergy testing    History of present illness: This is a pleasant 52-year-old gentleman who comes to see me for evaluation of allergies.  He states that a couple years ago he was noted to have some nasal congestion and drainage in the fall.  He was seen by an urgent care clinic and told he had allergies.  He has been taking Zyrtec throughout the year to help with this.  He was recently seen by his sleep physician.  He was told that he had a swollen nose and may want to be evaluated for allergies.  Of note, in 2013 he had specific IgE testing to the respiratory disease panel which was negative.  He has no history of asthma.  No cough, wheeze or shortness of breath.  He notes that he has these recurrent upper respiratory tract illnesses during the spring and during the fall.  He does not use nasal sprays or rinses.    Past medical history: Obstructive sleep apnea    Social history: No pets at home, lives in a home that was built in 1988, intermittent carpeting, no mold, non-smoker    Family history: Negative for allergies and asthma    Review of Systems performed as above and the remainder is negative.      Current Outpatient Prescriptions:      metoprolol tartrate (LOPRESSOR) 50 MG tablet, TAKE 1 TABLET(50 MG) BY MOUTH TWICE DAILY, Disp: 180 tablet, Rfl: 0     multivitamin (ONE A DAY) per tablet, Take 1 tablet by mouth daily., Disp: , Rfl:      cetirizine (ZYRTEC) 10 MG tablet, Take 10 mg by mouth daily., Disp: , Rfl:     Allergies   Allergen Reactions     Penicillins Hives       /68  Pulse 63  SpO2 97%  Gen: Pleasant male not in acute distress  HEENT: Eyes no erythema of the bulbar or palpebral conjunctiva, no edema. Ears: TMs well visualized, no effusions. Nose: No congestion, mucosa normal. Mouth: Throat clear, no lip or tongue edema.   Cardiac: Regular rate and rhythm, no murmurs, rubs or gallops  Respiratory: Clear to auscultation bilaterally, no adventitious breath sounds  Lymph: No  supraclavicular or cervical lymphadenopathy  Skin: No rashes or lesions  Psych: Alert and oriented times 3    Last Percutaneous Allergy Test Results  Trees  Alexis, White  1:20 H  (W/F in mm): 0/0 (04/06/17 0815)  Birch Mix 1:20 H (W/F in mm): 0/0 (04/06/17 0815)  Pickaway, Common 1:20 H (W/F in mm): 0/0 (04/06/17 0815)  Elm, American 1:20 H (W/F in mm): 0/0 (04/06/17 0815)  Roderfield, Shagbark 1:20 H (W/F in mm): 0/0 (04/06/17 0815)  Maple, Hard/Sugar 1:20 H (W/F in mm): 0/0 (04/06/17 0815)  Rockingham Mix 1:20 H (W/F in mm): 0/0 (04/06/17 0815)  Wilsons, Red 1:20 H (W/F in mm): 0/0 (04/06/17 0815)  Upper [place], American 1:20 H (W/F in mm): 0/0 (04/06/17 0815)  Melrose Tree 1:20 H (W/F in mm): 0/0 (04/06/17 0815)  Dust Mites  D. Pteronyssinus Mite 30,000 AU/ML H (W/F in mm): 0/0 (04/06/17 0815)  D. Farinae Mite 30,000 AU/ML H (W/F in mm: 0/0 (04/06/17 0815)  Grasses  Grass Mix #4 10,000 BAU/ML H: 0/0 (04/06/17 0815)  Alexander Grass 1:20 H (W/F in mm): 0/0 (04/06/17 0815)  Cockroach  Cockroach Mix 1:10 H (W/F in mm): 0/0 (04/06/17 0815)  Molds/Fungi  Alternaria Tenuis 1:10 H (W/F in mm): 0/0 (04/06/17 0815)  Aspergillus Fumigatus 1:10 H (W/F in mm): 0/0 (04/06/17 0815)  Homodendrum Cladosporioides 1:10 H (W/F in mm): 0/0 (04/06/17 0815)  Penicillin Notatum 1:10 H (W/F in mm): 0/0 (04/06/17 0815)  Epicoccum 1:10 H (W/F in mm): 0/0 (04/06/17 0815)  Weeds  Ragweed, Short 1:20 H (W/F in mm): 0/0 (04/06/17 0815)  Dock, Sorrel 1:20 H (W/F in mm): 0/0 (04/06/17 0815)  Lamb's Quarter 1:20 H (W/F in mm): 0/0 (04/06/17 0815)  Pigweed, Rough Red Root 1:20 H  (W/F in mm): 0/0 (04/06/17 0815)  Plantain, English 1:20 H  (W/F in mm): 0/0 (04/06/17 0815)  Sagebrush, Mugwort 1:20 H  (W/F in mm): 0/0 (04/06/17 0815)  Animal  Cat 10,000 BAU/ML H (W/F in mm): 0/0 (04/06/17 0815)  Dog 1:10 H (W/F in mm): 0/0 (04/06/17 0815)  Controls  Device Type: QUINTIP (04/06/17 0815)  Neg. control: 50% Glycerine/Saline H (W/F in mm): 0/0 (04/06/17 0815)  Pos.  control: Histamine 6mg/ML (W/F in mms): 7/20 (04/06/17 7054)    Impression report and plan:    1.  Nasal congestion    Allergy testing was negative.  I would recommend the use of nasal rinses.  He could consider the addition of fluticasone nasal spray.  I would stop antihistamines given the negative allergy test.  Follow as needed.

## 2021-06-10 NOTE — PROGRESS NOTES
"     Assessment/Plan:     1. Routine general medical examination at a health care facility  Routine healthcare maintenance.  Preventative cares reviewed.  Immunizations reviewed and up-to-date.  Prior colonoscopy September 8, 2017 with hyperplastic polyp otherwise told to repeat at 10-year interval.  Annual physical exams to continue.    2. Overweight  Dietary and exercise modification for weight goal less than 200 pounds initially, less than 195 pounds ideally.    3. JENNIFER (obstructive sleep apnea)  History of JENNIFER on CPAP which continues.  Tolerating well with benefits noted.    4. Apical variant hypertrophic cardiomyopathy (H)  History of apical variant hypertrophic cardiomyopathy.  Seen through [facility] with last visit around 2017.  Did recommend follow-up with cardiologist for ongoing monitoring \"every few years\".    5. Hyperlipidemia, unspecified hyperlipidemia type  Hyperlipidemia.  Check lipid cascade today.  Dietary and exercise modifications reviewed as noted above.  - Lipid Cascade    6. Acute idiopathic gout of left foot  Recent of left great toe gout flare involving IP joint with prior crystal confirmation with fluid aspirate.  Has colchicine available and can use 1.2 mg initially followed by 0.6 mg 1 hour later.  Uric acid for goal less than 6.  Gout diet to be followed.  CBC obtained.  - HM2(CBC w/o Differential)  - Uric Acid    7. Elevated AST (SGOT)  Has had mild AST elevation.  Dietary modifications for management of overweight status with potential hepatic steatosis etiology.  - Comprehensive Metabolic Panel    8. Encounter for screening for HIV  HIV screen, low risk.  - HIV Antigen/Antibody Screening Cascade    9. History of herpes zoster  History of recent herpes zoster, resolved.    10. Atypical pigmented skin lesion  Multiple skin lesions.  Some hyper and hypopigmented lesions noted as well.  Patient to see dermatologist for complete skin exam.  - Ambulatory referral to Dermatology       The " "following high BMI interventions were performed this visit: encouragement to exercise, weight monitoring, weight loss from baseline weight and lifestyle education regarding diet.  Ensure ongoing efforts to achieve weight goal < 200 pounds initially, < 195 pounds ideally.              Subjective:      Gagan Mejía is a 55 y.o. male who presents for an annual exam.  In general doing well.  Recent shingles described.  Better after 5 days of medication.  No residual issues.  CPAP for JENNIFER management.  History of hypertrophic cardiomyopathy and had been seen through [facility] previously and likely seen in 2017 and told to follow-up in a \"couple years\".  Has had mild lipid elevation in the past.  Recent gout flare great toe.  Prior joint aspiration with crystals noted.  Has colchicine available.  Has not used recently.  Mild AST elevation at 52 with prior platelet count 130,000.  Most recent uric acid level 7.5 on June 4, 2020.  Sun exposure over the years.  Family history of skin cancer in patient's brother perhaps involving his nose.  Patient has areas of hypopigmentation on his hands and scrotum.  Comprehensive review of systems as above otherwise all negative.     - \"Lolly\"   S.O. 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 Rossi); right shoulder arthroscopy 3/31/16 (Dr. Bales) for subacromial decompression and partial thickness tear repair of supraspinatus)  Pneumonia LLL - 2005     CPAP (variable) ~ 8 (starts at 5 and goes up to 8-10)    Healthy Habits:   Regular Exercise: Yes  Healthy Diet: Yes  Dental Visits Regularly: Yes  Seat Belt: Yes   Sexually active: Yes  Colonoscopy: Yes and 9/8/17 - hyperplastic polyp (repeat " in 10 years)  Lipid Profile: Yes  Glucose Screen: Yes    Immunization History   Administered Date(s) Administered     DT (pediatric) 05/17/2002     Influenza, Seasonal, Inj PF IIV3 10/14/2009     Influenza, inj, historic,unspecified 10/01/2015     Influenza,seasonal quad, PF, =/> 6months 10/19/2018     Influenza,seasonal, Inj IIV3 10/13/2010, 10/13/2011, 09/25/2012, 10/08/2013     Influenza,seasonal,quad inj =/> 6months 10/19/2016, 10/21/2019     Novel Influenza Q9M1-15, Nasal 11/24/2009     Pneumo Polysac 23-V 10/19/2016     Td, adult adsorbed, PF 06/04/2020     Td,adult,historic,unspecified 05/17/2002     Tdap 07/02/2009     Immunization status: up to date and documented.  Vision Screening:both eyes  Hearing: PASS     Current Outpatient Medications   Medication Sig Dispense Refill     cetirizine (ZYRTEC) 10 MG tablet Take 10 mg by mouth daily.       multivitamin (ONE A DAY) per tablet Take 1 tablet by mouth daily.       colchicine 0.6 mg tablet Take 1 tablet (0.6 mg total) by mouth 2 (two) times a day. 60 tablet 2     No current facility-administered medications for this visit.      Past Medical History:   Diagnosis Date     Apical variant hypertrophic cardiomyopathy (H) 4/3/2016     JENNIFER on CPAP, mild to moderate per patient 11/2015     Rotator cuff tear, right      Past Surgical History:   Procedure Laterality Date     KNEE ARTHROSCOPY       LASIK       ROTATOR CUFF REPAIR Right      Penicillins  Family History   Problem Relation Age of Onset     No Medical Problems Mother      Fainting Father      No Medical Problems Son      Sleep apnea Brother      No Medical Problems Brother      No Medical Problems Brother      No Medical Problems Sister      No Medical Problems Sister      Social History     Socioeconomic History     Marital status:      Spouse name: Not on file     Number of children: 1     Years of education: Not on file     Highest education level: Not on file   Occupational History      "Occupation:    Social Needs     Financial resource strain: Not on file     Food insecurity     Worry: Not on file     Inability: Not on file     Transportation needs     Medical: Not on file     Non-medical: Not on file   Tobacco Use     Smoking status: Never Smoker     Smokeless tobacco: Never Used   Substance and Sexual Activity     Alcohol use: Yes     Comment: 1-2 times per week     Drug use: No     Sexual activity: Not on file   Lifestyle     Physical activity     Days per week: Not on file     Minutes per session: Not on file     Stress: Not on file   Relationships     Social connections     Talks on phone: Not on file     Gets together: Not on file     Attends Voodoo service: Not on file     Active member of club or organization: Not on file     Attends meetings of clubs or organizations: Not on file     Relationship status: Not on file     Intimate partner violence     Fear of current or ex partner: Not on file     Emotionally abused: Not on file     Physically abused: Not on file     Forced sexual activity: Not on file   Other Topics Concern     Not on file   Social History Narrative     Not on file       Review of Systems  Comprehensive ROS: as above, otherwise all negative.           Objective:     /80   Pulse 69   Temp (!) 96.5  F (35.8  C)   Ht 5' 11\" (1.803 m)   Wt 209 lb (94.8 kg)   SpO2 98%   BMI 29.15 kg/m    Body mass index is 29.15 kg/m .    Physical    General Appearance:    Alert, cooperative, no distress, appears stated age.  Tan skin.   Head:    Normocephalic, without obvious abnormality, atraumatic   Eyes:    PERRL, conjunctiva/corneas clear, EOM's intact, fundi     benign, both eyes.  Glasses.        Ears:    Normal TM's and external ear canals, both ears   Nose:   Nares normal, septum midline, mucosa normal, no drainage    or sinus tenderness   Throat:   Lips, mucosa, and tongue normal; teeth and gums normal   Neck:   Supple, symmetrical, trachea midline, no " adenopathy;        thyroid:  No enlargement/tenderness/nodules; no carotid    bruit or JVD   Back:     Symmetric, no curvature, ROM normal, no CVA tenderness   Lungs:     Clear to auscultation bilaterally, respirations unlabored   Chest wall:    No tenderness or deformity   Heart:    Regular rate and rhythm, S1 and S2 normal, no murmur, rub   or gallop   Abdomen:     Soft, non-tender, bowel sounds active all four quadrants,     no masses, no organomegaly.     Genitalia:    Normal male without lesion, discharge or tenderness.  No inguinal hernia noted.     Rectal:    Normal tone.  Prostate normal/symmetric, no masses or tenderness.   Extremities:   Extremities normal, atraumatic, no cyanosis or edema.  Left great toe swelling with IP joint tenderness, mild without significant fluctuance.   Pulses:   2+ and symmetric all extremities   Skin:   Skin color, texture, turgor normal, no rashes.  Hypopigmented lesions of hands and scrotum.   Lymph nodes:   Cervical, supraclavicular, and axillary nodes normal   Neurologic:   CNII-XII intact. Normal strength, sensation and reflexes       throughout                This note has been dictated using voice recognition software and as a result may contain minor grammatical errors and unintended word substitutions.

## 2021-06-11 NOTE — PROGRESS NOTES
Assessment:     1. Routine general medical examination at a [facility] care facility  Ambulatory referral for Colonoscopy   2. Overweight (BMI 25.0-29.9)     3. Apical variant hypertrophic cardiomyopathy  Comprehensive Metabolic Panel    Thyroid Stimulating Hormone (TSH)   4. JENNIFER (obstructive sleep apnea)      on CPAP   5. Male erectile dysfunction  Testosterone, Total and Free    HM2(CBC w/o Differential)   6. Elevated ALT measurement  Comprehensive Metabolic Panel    GGT (Gamma GT)   7. Hyperlipidemia  Lipid Cascade   8. Right shoulder tendonitis     9. Viral upper respiratory illness          Plan:      Routine healthcare maintenance.  Preventative cares reviewed.  Patient will schedule screening colonoscopy.  Immunizations reviewed and up-to-date.  Continue CPAP for JENNIFER management uncertain pressure setting perhaps 8-10 cm of water pressure.  Patient will follow with Dr. Dorantes cardiologist [facility] Monday, June 5, 2017 regarding hypertrophic cardiomyopathy.  Remains asymptomatic.  Completing regular cardiovascular activity with 19 pound weight loss since May 24, 2016 noted.  Annual physical exams to continue.    I have had an Advance Directives discussion with the patient.  The following high BMI interventions were performed this visit: encouragement to exercise, weight monitoring, weight loss from baseline weight and lifestyle education regarding diet .  Weight goal < 200 pounds initially, < 195 pounds ideally.     Discussed recurrent right shoulder pain following prior shoulder arthroscopy with Dr. Deepak Bales March 31, 2016 including partial supraspinatus tendon tear repair with subacromial decompression.  Will avoid exacerbating triggers and consider repeat MRI if ongoing concerns otherwise patient to avoid exacerbating triggers including overhand serving with tennis etc.    Discussed findings of male erectile dysfunction.  Remains off metoprolol without obvious benefit.  Will check free and total  "testosterone levels this morning as well as CBC, TSH and conference metabolic panel without history of significant hypertension, diabetes etc.    Symptomatic treatments for upper respiratory illness discussed.  Notify persistent concerns or worsening through the weekend with consideration for antibiotic if symptoms persist 10-14 days.    Did check liver function tests including GGT regarding history of mild ALT elevation likely secondary to hepatic steatosis.  Ensure restrict alcohol to no more than 2 ounces daily.    Subjective:      Gagan Mejía is a 52 y.o. male who presents for an annual exam.  Has not had screening colonoscopy.  No significant family history of colon cancer.  History of hypertrophic cardiomyopathy followed by cardiologist at [facility] with scheduled follow-up June 5, 2017.  Has weaned from metoprolol tartrate 50 mg twice daily due to concerns previously with erectile difficulties.  Uncertain if significant benefit however not in a relationship currently since Easter.  Continue CPAP for JENNIFER management.  Has lost 19 pounds to dietary and exercise modifications since the spring and hopes to achieve weight less than 200 pounds initially.  Has had recent cold symptoms with nasal congestion without significant cough.  No orthopnea or PND symptoms.  Right shoulder pain.  Worse over past several weeks.  Worse with overhand serving during tennis.  Did have right shoulder arthroscopy March 31, 2016 with partial supraspinatus tear repair as well as subacromial decompression performed by Dr. Cao.  Has had mild ALT elevation of 44.  Might have a couple beer on Wednesday as well as 4-6 on the weekend.  No jaundice.  Normal appetite.  No diarrhea or constipation.     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 Rossi)  Pneumonia LLL - 2005     CPAP (variable) ~ 8 (starts at 5 and goes up to 8-10)    Healthy Habits:   Regular Exercise: Yes and walk, bike, and tennis (has lost 20# in past year)  Healthy Diet: Yes  Dental Visits Regularly: Yes  Seat Belt: Yes   Sexually active: Yes  Colonoscopy: No and will schedule  Lipid Profile: Yes  Glucose Screen: Yes    Immunization History   Administered Date(s) Administered     DT (pediatric) 05/17/2002     Influenza, inj, historic 10/01/2015     Influenza, seasonal,quad inj 36+ mos 10/19/2016     Pneumo Polysac 23-V 10/19/2016     Td, historic 05/17/2002     Tdap 07/02/2009     Immunization status: up to date and documented.  Vision Screening:both eyes  Hearing: PASS     Current Outpatient Prescriptions   Medication Sig Dispense Refill     multivitamin (ONE A DAY) per tablet Take 1 tablet by mouth daily.       metoprolol tartrate (LOPRESSOR) 50 MG tablet TAKE 1 TABLET(50 MG) BY MOUTH TWICE DAILY 180 tablet 0     No current facility-administered medications for this visit.      Past Medical History:   Diagnosis Date     Apical variant hypertrophic cardiomyopathy 4/3/2016     JENNIFER on CPAP, mild to moderate per patient 11/2015     Rotator cuff tear, right      Past Surgical History:   Procedure Laterality Date     KNEE ARTHROSCOPY       LASIK       ROTATOR CUFF REPAIR Right      Penicillins  Family History   Problem Relation Age of Onset     No Medical Problems Mother      Fainting Father      No Medical Problems Son      Sleep apnea Brother      No Medical Problems Brother      No Medical Problems Brother      No Medical Problems Sister      No Medical Problems Sister      Social History     Social History     Marital status:      Spouse name: N/A     Number of children: 1     Years of education: N/A     Occupational History           Social History Main Topics     Smoking status: Never  "Smoker     Smokeless tobacco: Never Used     Alcohol use Yes      Comment: 1-2 times per week     Drug use: No     Sexual activity: Not on file     Other Topics Concern     Not on file     Social History Narrative       Review of Systems  Comprehensive ROS: as above, otherwise all negative.           Objective:     /68  Pulse 66  Resp 16  Ht 5' 11\" (1.803 m)  Wt 213 lb (96.6 kg)  SpO2 97%  BMI 29.71 kg/m2  Body mass index is 29.71 kg/(m^2).    Physical    General Appearance:    Alert, cooperative, no distress, appears stated age.  Overweight.   Head:    Normocephalic, without obvious abnormality, atraumatic   Eyes:    PERRL, conjunctiva/corneas clear, EOM's intact, fundi     benign, both eyes        Ears:    Normal TM's and external ear canals, both ears   Nose:   Nares normal, septum midline, mucosa normal, no drainage    or sinus tenderness   Throat:   Lips, mucosa, and tongue normal; teeth and gums normal   Neck:   Supple, symmetrical, trachea midline, no adenopathy;        thyroid:  No enlargement/tenderness/nodules; no carotid    bruit or JVD   Back:     Symmetric, no curvature, ROM normal, no CVA tenderness   Lungs:     Clear to auscultation bilaterally, respirations unlabored   Chest wall:    No tenderness or deformity   Heart:    Regular rate and rhythm, S1 and S2 normal, no murmur, rub   or gallop   Abdomen:     Soft, non-tender, bowel sounds active all four quadrants,     no masses, no organomegaly.     Genitalia:    Normal male without lesion, discharge or tenderness.  No inguinal hernia noted.     Rectal:    Normal tone.  Prostate normal/symmetric, no masses or tenderness.   Extremities:   Extremities normal, atraumatic, no cyanosis or edema   Pulses:   2+ and symmetric all extremities   Skin:   Skin color, texture, turgor normal, no rashes or lesions.  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     Lymph nodes:   Cervical, supraclavicular, and axillary nodes normal   Neurologic:   CNII-XII " intact. Normal strength, sensation and reflexes       throughout

## 2021-06-12 NOTE — TELEPHONE ENCOUNTER
Coronavirus (COVID-19) Notification    Reason for call  Notify of POSITIVE  COVID-19 lab result, assess symptoms,  review [facility] recommendations    Lab Result   Lab test for 2019-nCoV rRt-PCR or SARS-COV-2 PCR  Oropharyngeal AND/OR nasopharyngeal swabs were POSITIVE for 2019-nCoV RNA [OR] SARS-COV-2 RNA (COVID-19) RNA     We have been unable to reach Patient by phone at this time to notify of their Positive COVID-19 result.  Left voicemail message requesting a call back to [phone] [facility] for results.        POSITIVE COVID-19 Letter sent.    [Name]  Antonieta Lynch RN

## 2021-06-13 NOTE — PROGRESS NOTES
Assessment and Plan:     1. Pain of left scapula  XR Chest PA and Lateral   2. Elevated hemidiaphragm  XR Chest PA and Lateral    Ambulatory referral to Pulmonology   3. Apical variant hypertrophic cardiomyopathy       Discussed possible muscle tension or rotator cuff tedonitis. He declines prescription for pain medication and muscle relaxant.  Will refer to pulmonology for known elevated hemidiaphragm.  I encouraged him to speak to his cardiologist about his symptoms.  He was seen by [place] [facility]ology in June where his cardiomyopathy was stable.  He will follow-up with Dr. Nicholas if symptoms persist or worsen.    Subjective:     Gagan is a 52 y.o. male presenting to the clinic for concerns of left scapular pain for 1 month.  Patient complains of a tenderness and a bruised sensation.  He describes the pain as an intermittent sharp sensation lasting 5-10 minutes.  He has been exercising by riding his bike and tennis.  He did try to rest the area which has not provided assistance.  He feels the pain when he sits at the edge of the bed at night looking at his phone.  Laying down improves his symptoms.  Last week he developed pain while walking and felt mildly short of breath.  He denies any recent cold symptoms including rhinorrhea, postnasal drainage, cough, chest tightness, wheezing.  No fever has been present.  He denies any recent travel.  He has not had any chest pain. He has right elevated hemidiaphragm. He sees [place] for cardiomyopathy.     Review of Systems: A complete 14 point review of systems was obtained and is negative or as stated in the history of present illness.    Social History     Social History     Marital status:      Spouse name: N/A     Number of children: 1     Years of education: N/A     Occupational History           Social History Main Topics     Smoking status: Never Smoker     Smokeless tobacco: Never Used     Alcohol use Yes      Comment: 1-2 times per week      "Drug use: No     Sexual activity: Not on file     Other Topics Concern     Not on file     Social History Narrative       Active Ambulatory Problems     Diagnosis Date Noted     Obesity      JENNIFER (obstructive sleep apnea) 03/29/2016     Apical variant hypertrophic cardiomyopathy 04/03/2016     Elevated hemidiaphragm 04/08/2016     Hyperlipidemia 06/02/2017     Elevated ALT measurement 06/02/2017     Male erectile dysfunction 06/02/2017     Overweight (BMI 25.0-29.9) 06/02/2017     Resolved Ambulatory Problems     Diagnosis Date Noted     Dizziness      Foot Pain (Soft Tissue)      Allergic rhinitis      Subcutaneous Lipoma      Acute Sinusitis      Community-acquired Pneumonia      Rotator Cuff Tendonitis      Fever (Symptom)      Fatigue      Cough      Skin Neoplasm Of Uncertain Behavior      Xerosis Cutis      Joint Pain, Localized In The Right Shoulder      [place] Nevus      Rotator cuff tear, right      Acute chest pain      Past Medical History:   Diagnosis Date     Apical variant hypertrophic cardiomyopathy 4/3/2016     JENNIFER on CPAP, mild to moderate per patient 11/2015     Rotator cuff tear, right        Family History   Problem Relation Age of Onset     No Medical Problems Mother      Fainting Father      No Medical Problems Son      Sleep apnea Brother      No Medical Problems Brother      No Medical Problems Brother      No Medical Problems Sister      No Medical Problems Sister        Objective:     /62 (Patient Site: Left Arm, Patient Position: Sitting, Cuff Size: Adult Large)  Pulse 60  Ht 5' 10.5\" (1.791 m)  Wt 191 lb (86.6 kg)  SpO2 98%  BMI 27.02 kg/m2    Patient is alert, in no obvious distress.   Skin: Warm, dry.  No lesions or rashes.  Skin turgor rapid return.   HEENT:  Head normocephalic, atraumatic.  Eyes normal.   Ears normal.  Nose patent, mucosa pink.  Oropharynx mucosa pink.  No lesions or tonsillar enlargement.   Neck: Supple, no lymphadenopathy.   Lungs:  Clear to " auscultation. Respirations even and unlabored.  No wheezing or rales noted.   Heart:  Regular rate and rhythm.  No murmurs.   Musculoskeletal:  Full ROM of extremities.  DTRs symmetrical, sensations intact.  No obvious deformity.  Muscle strength equal +5/5. The pain is located with the mid curvature of the scapula.  He is slightly tender to palpation.     LABORATORY: I ordered and personally reviewed a chest x-ray showing no obvious infiltrate.  Right hemidiaphragm is present. Will have radiology review.

## 2021-06-13 NOTE — PROGRESS NOTES
"Pulmonary Clinic Follow Up    Cc; back pain    HPI: 52yoM with history of elevated hemidiaphragm with normal Sniff test presents with symptoms of back pain and dyspnea.    The patient was first seen in clinic in 2016 after CXR found right hemidiaphragm considerably elevated. Sniff test performed subsequently was normal--both diaphragms moved downward without issue. He remained concerned about this abnormal imaging and met with Dr. Samuel, our thoracic surgeon. Given his lack of dyspnea and the invasiveness of any attempt to address the diaphragm they decided to do nothing.    He now returns after a visit with his PCP's office regarding  Pain on the left side of his back near his scapula. It does not sound pleuritic. It started when he would sit in his bed and look at his phone. It did not occur at other times. After 1 month he started to have the pain at other times as well like while at work or doing dishes. Then hurt while walking. He describes it as \"crampy' and chanigng positions helps it go away.  Not made worse by activity and no hemoptysis.     Current Outpatient Prescriptions   Medication Sig     multivitamin (ONE A DAY) per tablet Take 1 tablet by mouth daily.     Vitals:    10/06/17 1058   BP: 120/72   Pulse: 60   Resp: 16   SpO2: 99%   RA  Gen: NAD  C/V: RRR, S1 and Sw  Resp: Clear bilaterally  Ext: no edema  Skin: no vessicles or rash at the area of pain. Not reproducible currently although he is not having the pain currently.    CXR: 9/2017: personally reviewed. The previous elevation of the right hemidiaphragm has now resolved. There is nothing in the area of the pain that explains this, clear parenchyma.    ASSESSMENT/PLAN:  52yoM with scapular pain that is consistent with musculoskeletal pain. He was concerned that it may be related to the hemidiaphragm elevation. We viewed the CXR and he was happy to learn this had resolved in the setting of signifcant weight loss as well. I recommended he trial " NSAIDs and massage or physical therapy to attempt to address the muscle (wonder if this wasn't worsened by use of his phone and poor posture).    He was invited to return on an as-needed basis.    Saloni Wilhelm MD  Electronically signed on 10/28/2017 11:02 AM    >50% of this 30 minute visit spent in direct patient counseling.

## 2021-08-04 ENCOUNTER — TRANSFERRED RECORDS (OUTPATIENT)
Dept: HEALTH INFORMATION MANAGEMENT | Facility: CLINIC | Age: 56
End: 2021-08-04

## 2021-10-03 ENCOUNTER — HEALTH MAINTENANCE LETTER (OUTPATIENT)
Age: 56
End: 2021-10-03

## 2022-08-16 ENCOUNTER — OFFICE VISIT (OUTPATIENT)
Dept: FAMILY MEDICINE | Facility: CLINIC | Age: 57
End: 2022-08-16
Payer: COMMERCIAL

## 2022-08-16 VITALS
DIASTOLIC BLOOD PRESSURE: 88 MMHG | SYSTOLIC BLOOD PRESSURE: 120 MMHG | BODY MASS INDEX: 29.01 KG/M2 | WEIGHT: 208 LBS | OXYGEN SATURATION: 99 % | HEART RATE: 55 BPM

## 2022-08-16 DIAGNOSIS — M79.672 LEFT FOOT PAIN: Primary | ICD-10-CM

## 2022-08-16 PROCEDURE — 99213 OFFICE O/P EST LOW 20 MIN: CPT | Performed by: NURSE PRACTITIONER

## 2022-08-16 NOTE — PROGRESS NOTES
Assessment & Plan     Left foot pain  Plantar fasciitis versus achilles tendonitis.  While his symptoms present as plantar fasciitis, he is most tender over the posterior calcaneus.  Discussed that treatment is primarily the same for both.  Recommend icing the foot and heel 2-3x per day.  Ibuprofen - up to 600 mg TID with food for the next couple of days.  Recommend wearing a supportive, well-cushioned shoe both inside and outside.  Avoid going barefoot.  Activity as tolerated.  Discussed stretches to complete prior to getting up and walking in the morning.  He can also complete these during the day.  If symptoms not improving in the next 4-6 weeks, can consider a podiatry referral. Patient agrees with plan of care.             Return if symptoms worsen or fail to improve.    Esthela Bonner NP  [facility]    Sumit Borrego is a 57 year old, presenting for the following health issues:  Musculoskeletal Problem (Thinks possible plantar fascitis, left foot, tender)    Complains of left heel and foot pain over the summer, but worsened over the weekend after playing in a softball game.  He has severe pain especially in the morning, which makes walking very difficult.  It seems to improve, but not resolve throughout the day.  History of gout, but he does not have any swelling, redness, or warmth to any of his foot joints.  No specific injury that he can recall.  Works from home and often times goes barefoot.  Specifically concerned as he would like [place] without pain.  He has been icing and using Ibuprofen.     Musculoskeletal Problem    History of Present Illness       Reason for visit:  Possible plantar fasciitis  Symptom onset:  More than a month  Symptoms include:  Tender left foot in the heel area. Severe pain when trying to walk in the morning that gets a little better as day goes on.  Symptom intensity:  Severe  Symptom progression:   Worsening  Had these symptoms before:  No  What makes it worse:  No  What makes it better:  Ice and ibuprofen help a little    He eats 2-3 servings of fruits and vegetables daily.He consumes 1 sweetened beverage(s) daily.He exercises with enough effort to increase his heart rate 20 to 29 minutes per day.  He exercises with enough effort to increase his heart rate 4 days per week.   He is taking medications regularly.         Review of Systems   Pertinent items in HPI      Objective    /88   Pulse 55   Wt 94.3 kg (208 lb)   SpO2 99%   BMI 29.01 kg/m    Body mass index is 29.01 kg/m .  Physical Exam   GENERAL: healthy, alert and no distress  MS: left foot appears normal without acute deformity, swelling, redness, or warmth. Pain palpated over the posterior calcaneus and extending partially into the plantar fascia.

## 2022-09-10 ENCOUNTER — HEALTH MAINTENANCE LETTER (OUTPATIENT)
Age: 57
End: 2022-09-10

## 2023-01-22 ENCOUNTER — HEALTH MAINTENANCE LETTER (OUTPATIENT)
Age: 58
End: 2023-01-22

## 2023-03-29 ENCOUNTER — TRANSFERRED RECORDS (OUTPATIENT)
Dept: HEALTH INFORMATION MANAGEMENT | Facility: CLINIC | Age: 58
End: 2023-03-29

## 2023-04-14 ENCOUNTER — TRANSFERRED RECORDS (OUTPATIENT)
Dept: HEALTH INFORMATION MANAGEMENT | Facility: CLINIC | Age: 58
End: 2023-04-14
Payer: COMMERCIAL

## 2023-04-29 ENCOUNTER — APPOINTMENT (OUTPATIENT)
Dept: RADIOLOGY | Facility: HOSPITAL | Age: 58
End: 2023-04-29
Attending: EMERGENCY MEDICINE
Payer: COMMERCIAL

## 2023-04-29 ENCOUNTER — HOSPITAL ENCOUNTER (EMERGENCY)
Facility: HOSPITAL | Age: 58
Discharge: HOME | End: 2023-04-29
Attending: EMERGENCY MEDICINE | Admitting: EMERGENCY MEDICINE
Payer: COMMERCIAL

## 2023-04-29 VITALS
HEIGHT: 71 IN | DIASTOLIC BLOOD PRESSURE: 73 MMHG | RESPIRATION RATE: 16 BRPM | BODY MASS INDEX: 29.12 KG/M2 | WEIGHT: 208 LBS | SYSTOLIC BLOOD PRESSURE: 123 MMHG | OXYGEN SATURATION: 96 % | TEMPERATURE: 98.5 F | HEART RATE: 91 BPM

## 2023-04-29 DIAGNOSIS — D69.6 THROMBOCYTOPENIA (H): ICD-10-CM

## 2023-04-29 DIAGNOSIS — J18.9 COMMUNITY ACQUIRED PNEUMONIA OF LEFT LOWER LOBE OF LUNG: ICD-10-CM

## 2023-04-29 DIAGNOSIS — R55 SYNCOPE, UNSPECIFIED SYNCOPE TYPE: ICD-10-CM

## 2023-04-29 PROBLEM — M54.9 MUSCULOSKELETAL BACK PAIN: Status: ACTIVE | Noted: 2017-10-28

## 2023-04-29 PROBLEM — M10.072 ACUTE IDIOPATHIC GOUT OF LEFT FOOT: Status: ACTIVE | Noted: 2020-08-19

## 2023-04-29 PROBLEM — R74.01 ELEVATED ALT MEASUREMENT: Status: ACTIVE | Noted: 2017-06-02

## 2023-04-29 PROBLEM — E66.3 OVERWEIGHT (BMI 25.0-29.9): Status: ACTIVE | Noted: 2017-06-02

## 2023-04-29 PROBLEM — B02.9 HERPES ZOSTER WITHOUT COMPLICATION: Status: ACTIVE | Noted: 2020-07-15

## 2023-04-29 PROBLEM — R74.01 ELEVATED AST (SGOT): Status: ACTIVE | Noted: 2020-08-19

## 2023-04-29 PROBLEM — Z86.19 HISTORY OF HERPES ZOSTER: Status: ACTIVE | Noted: 2020-08-19

## 2023-04-29 PROBLEM — E78.5 HYPERLIPIDEMIA: Status: ACTIVE | Noted: 2017-06-02

## 2023-04-29 PROBLEM — N52.9 ERECTILE DYSFUNCTION: Status: ACTIVE | Noted: 2017-06-02

## 2023-04-29 LAB
ANION GAP SERPL CALCULATED.3IONS-SCNC: 13 MMOL/L (ref 7–15)
BUN SERPL-MCNC: 11.4 MG/DL (ref 6–20)
CALCIUM SERPL-MCNC: 8.9 MG/DL (ref 8.6–10)
CHLORIDE SERPL-SCNC: 100 MMOL/L (ref 98–107)
CREAT SERPL-MCNC: 1.37 MG/DL (ref 0.67–1.17)
D DIMER PPP FEU-MCNC: <0.27 UG/ML FEU (ref 0–0.5)
DEPRECATED HCO3 PLAS-SCNC: 22 MMOL/L (ref 22–29)
ERYTHROCYTE [DISTWIDTH] IN BLOOD BY AUTOMATED COUNT: 12.9 % (ref 10–15)
FLUAV RNA SPEC QL NAA+PROBE: NEGATIVE
FLUBV RNA RESP QL NAA+PROBE: NEGATIVE
GFR SERPL CREATININE-BSD FRML MDRD: 61 ML/MIN/1.73M2
GLUCOSE SERPL-MCNC: 117 MG/DL (ref 70–99)
HCT VFR BLD AUTO: 44.1 % (ref 40–53)
HGB BLD-MCNC: 14.9 G/DL (ref 13.3–17.7)
LACTATE SERPL-SCNC: 1.4 MMOL/L (ref 0.7–2)
MCH RBC QN AUTO: 29.9 PG (ref 26.5–33)
MCHC RBC AUTO-ENTMCNC: 33.8 G/DL (ref 31.5–36.5)
MCV RBC AUTO: 88 FL (ref 78–100)
NT-PROBNP SERPL-MCNC: 850 PG/ML (ref 0–900)
PLATELET # BLD AUTO: 111 10E3/UL (ref 150–450)
POTASSIUM SERPL-SCNC: 4 MMOL/L (ref 3.4–5.3)
RBC # BLD AUTO: 4.99 10E6/UL (ref 4.4–5.9)
RSV RNA SPEC NAA+PROBE: NEGATIVE
SARS-COV-2 RNA RESP QL NAA+PROBE: NEGATIVE
SODIUM SERPL-SCNC: 135 MMOL/L (ref 136–145)
TROPONIN T SERPL HS-MCNC: 22 NG/L
WBC # BLD AUTO: 4.3 10E3/UL (ref 4–11)

## 2023-04-29 PROCEDURE — 85379 FIBRIN DEGRADATION QUANT: CPT | Performed by: EMERGENCY MEDICINE

## 2023-04-29 PROCEDURE — 80048 BASIC METABOLIC PNL TOTAL CA: CPT | Performed by: EMERGENCY MEDICINE

## 2023-04-29 PROCEDURE — 71046 X-RAY EXAM CHEST 2 VIEWS: CPT

## 2023-04-29 PROCEDURE — 83880 ASSAY OF NATRIURETIC PEPTIDE: CPT | Performed by: EMERGENCY MEDICINE

## 2023-04-29 PROCEDURE — 83605 ASSAY OF LACTIC ACID: CPT | Performed by: EMERGENCY MEDICINE

## 2023-04-29 PROCEDURE — 258N000003 HC RX IP 258 OP 636: Performed by: EMERGENCY MEDICINE

## 2023-04-29 PROCEDURE — 99285 EMERGENCY DEPT VISIT HI MDM: CPT | Mod: CS,25

## 2023-04-29 PROCEDURE — 87637 SARSCOV2&INF A&B&RSV AMP PRB: CPT | Performed by: EMERGENCY MEDICINE

## 2023-04-29 PROCEDURE — C9803 HOPD COVID-19 SPEC COLLECT: HCPCS

## 2023-04-29 PROCEDURE — 84484 ASSAY OF TROPONIN QUANT: CPT | Performed by: EMERGENCY MEDICINE

## 2023-04-29 PROCEDURE — 36415 COLL VENOUS BLD VENIPUNCTURE: CPT | Performed by: EMERGENCY MEDICINE

## 2023-04-29 PROCEDURE — 250N000013 HC RX MED GY IP 250 OP 250 PS 637: Performed by: EMERGENCY MEDICINE

## 2023-04-29 PROCEDURE — 93005 ELECTROCARDIOGRAM TRACING: CPT | Performed by: STUDENT IN AN ORGANIZED HEALTH CARE EDUCATION/TRAINING PROGRAM

## 2023-04-29 PROCEDURE — 85027 COMPLETE CBC AUTOMATED: CPT | Performed by: EMERGENCY MEDICINE

## 2023-04-29 RX ORDER — LEVOFLOXACIN 750 MG/1
750 TABLET, FILM COATED ORAL ONCE
Status: COMPLETED | OUTPATIENT
Start: 2023-04-29 | End: 2023-04-29

## 2023-04-29 RX ORDER — LEVOFLOXACIN 750 MG/1
750 TABLET, FILM COATED ORAL DAILY
Qty: 6 TABLET | Refills: 0 | Status: SHIPPED | OUTPATIENT
Start: 2023-04-29 | End: 2023-05-05

## 2023-04-29 RX ADMIN — LEVOFLOXACIN 750 MG: 750 TABLET, FILM COATED ORAL at 17:13

## 2023-04-29 RX ADMIN — SODIUM CHLORIDE 500 ML: 9 INJECTION, SOLUTION INTRAVENOUS at 16:07

## 2023-04-29 ASSESSMENT — ACTIVITIES OF DAILY LIVING (ADL)
ADLS_ACUITY_SCORE: 33
ADLS_ACUITY_SCORE: 35

## 2023-04-29 NOTE — ED TRIAGE NOTES
"  Pt has had a cough for 3 days and developed a fever of 102 this afternoon. pts sputum is green. Additionally, pt  Had a syncopal episode last night while out for [employer] that \"came out of blue\". Out for a few seconds.  PMH- hypertrophic cardiomyopathy.    "

## 2023-04-29 NOTE — ED PROVIDER NOTES
"  Emergency Department Encounter     Evaluation Date & Time:   No admission date for patient encounter.    CHIEF COMPLAINT:  Cough      Triage Note:       Impression and Plan       FINAL IMPRESSION:    ICD-10-CM    1. Syncope, unspecified syncope type  R55       2. Community acquired pneumonia of left lower lobe of lung  J18.9       3. Thrombocytopenia (H)  D69.6             ED COURSE & MEDICAL DECISION MAKING:  3:05 PM Introduced myself to the patient, obtained history of present illness, and performed initial physical exam at this time.   3:24 PM Checked on the patient.  4:41 PM Explained to [place] why consult was necessary   4:49 PM Spoke with [place] Cardiologist, Dr. Correia, regarding the patient.   5:01 PM [facility] called back to discuss current treatment plan and outpatient referral.  5:18 PM Updated the patient on consult. Discussed discharge at this time, he is agreeable with this plan.    58 year old male, history of hypertrophic cardiomyopathy, HLD, JENNIFER and gout, who presents for evaluation of a cough and syncope.    He reports cough and congestion x 1 week with cough productive of green sputum starting today. He had chills last night and a fever to 102 today. He also reports decreased appetite this week, but denies abdominal pain, N/V/D.     Throughout the week, he also has had mild episodes of lightheadedness. Then last night while eating dinner he had sudden onset of lightheadedness and proceeded to have a syncopal episode. He regained consciousness after ~10 seconds. He reports intermittent chest \"cramps\", but reports this is not new for him. No shortness of breath.     He has followed with Cardiology at [facility], but has not seen them since prior to the pandemic.    On exam, he has borderline tachycardic rate with regular rhythm and clear, symmetrical breath sounds.    Patient placed on cardiac monitor, IV access established and blood sent for labs.    EKG performed and demonstrated NSR with " lateral T wave inversions (similar to prior EKG) and no ST-T wave elevation consistent with ACS.  Troponin at upper limits of normal (22).    PE considered.  Patient low probability for which a D-dimer was checked and negative (<0.27).  Risks of CTA chest felt to outweigh benefit.  CXR performed and demonstrated low lung volumes with stable mild left basilar scarring. Mild medial left lower lobe airspace opacities may reflect atelectasis or pneumonic infiltrates. The right lung is clear. No pleural effusion. Normal heart size. Given cough x one week with fever, will treat with antibiotics.     No clinical, radiographic or laboratory () evidence of acute CHF.    His COVID, influenza and RSV tests were negative.  He has no leukocytosis (WBC 4.3) with normal lactate (1.4) and I do not suspect sepsis.    Labs otherwise remarkable for mild renal insufficiency (creatinine 1.37 with GFR 61); consider component of dehydration for which he was given IV fluids.     Patient will need Cardiology follow-up and he prefers to continue seeing Dr. Dorantes with [facility]. I consulted with the Cardiologist on-call at [facility] regarding their recommendations for discharge vs admission and, given reassuring evaluation, they advise discharge with outpatient Holter monitoring and follow-up with Dr. Dorantes. Patient to call Central Appointment Line (he was given their phone number) on Monday to arrange for Holter monitor and to schedule an appointment.    Patient is comfortable with discharge to home. He was given a prescription for Levaquin (penicillin allergy); given the first dose po in the ED. return precautions provided.  Patient stable throughout ED course.      At the conclusion of the encounter I discussed the results of all the tests and the disposition. The questions were answered. The patient and family acknowledged understanding and were agreeable with the care plan.      Medical Decision  Making    History:    Supplemental history from: Documented in chart, if applicable and Family Member/Significant Other    External Record(s) reviewed: Documented in chart, if applicable.    Work Up:    Chart documentation includes differential considered and any EKGs or imaging independently interpreted by provider, where specified.    4:23 PM    I independently reviewed the patient's CXR there is no focal consolidation, pneumothorax or pleural effusion; please refer to the radiologist's report for official read      In additional to work up documented, I considered the following work up: Documented in chart, if applicable. and Imaging CT, but deferred due to considered CTA chest, however deferred given negative d-dimer testing.    External consultation:    Discussion of management with another provider: Documented in chart, if applicable and Cardiology    Complicating factors:    Care impacted by chronic illness: Other: hypertrophic cardiomyopathy    Care affected by social determinants of health: N/A    Disposition considerations: Discharge. I prescribed additional prescription strength medication(s) as charted. I considered admission, but ultimately discharged patient given reassuring vitals, exam, evaluation and shared decision making conversation with input from Cardiology.          MEDICATIONS GIVEN IN THE EMERGENCY DEPARTMENT:  Medications   0.9% sodium chloride BOLUS (0 mLs Intravenous Stopped 4/29/23 1636)   levofloxacin (LEVAQUIN) tablet 750 mg (750 mg Oral $Given 4/29/23 1713)       NEW PRESCRIPTIONS STARTED AT TODAY'S ED VISIT:  Discharge Medication List as of 4/29/2023  5:24 PM      START taking these medications    Details   levofloxacin (LEVAQUIN) 750 MG tablet Take 1 tablet (750 mg) by mouth daily for 6 days, Disp-6 tablet, R-0, Local PrintPatient given first dose in the ED             HPI     The history is provided by the patient.      Gagan Mejía is a 58 year old male, history of hypertrophic  "cardiomyopathy, HLD, JENNIFER and gout, who presents to this ED for evaluation of a cough and syncope.    For the past week, the patient has had a cough and congestion. The cough became productive of green sputum today. He had chills last night, but did not take his temperature. Today he felt hot and had a fever to 102. He reports decreased appetite this week, but denies abdominal pain, N/V/D.     Throughout the week, he also has had mild episodes of lightheadedness. Then last night while the patient was eating dinner with his son, he experienced sudden onset of a hot flash associated with lightheadedness and he proceeded to have a syncopal episode. His son reported that he was unconscious for about 10 seconds. He denies any injury associated with the syncopal episode. After this episode, he was given ice and a cold towel by restaurant staff which helped to relieve his symptoms. He reports intermittent chest \"cramps\", but denies chest pain; reports that these chest cramps are not new for him. Denies shortness of breath. No lower extremity swelling or pain. No recent travel.     Patient's father has a history of CHF. No other family cardiac history or sudden death.     He has followed with Cardiology at [facility], but has not seen them since prior to the pandemic; follow-ups up to that point were good.       REVIEW OF SYSTEMS:  All other systems reviewed and are negative.      Medical History     Past Medical History:   Diagnosis Date     Apical variant hypertrophic cardiomyopathy (H) 4/3/2016     JENNIFER on CPAP 11/2015     Rotator cuff tear, right        Past Surgical History:   Procedure Laterality Date     ARTHROSCOPY KNEE       ARTHROSCOPY SHOULDER ROTATOR CUFF REPAIR Right      LASIK         Family History   Problem Relation Age of Onset     No Known Problems Mother      Syncope Father      No Known Problems Son      Sleep Apnea Brother      No Known Problems Brother      No Known Problems Brother      No Known " "Problems Sister      No Known Problems Sister        Social History     Tobacco Use     Smoking status: Never     Smokeless tobacco: Never   Substance Use Topics     Alcohol use: Yes     Comment: Alcoholic Drinks/day: 1-2 times per week     Drug use: No       levofloxacin (LEVAQUIN) 750 MG tablet  cetirizine (ZYRTEC) 10 MG tablet  colchicine (COLCYRS) 0.6 MG tablet  multivitamin (ONE A DAY) per tablet        Physical Exam     First Vitals:  Patient Vitals for the past 24 hrs:   BP Temp Temp src Pulse Resp SpO2 Height Weight   04/29/23 1711 123/73 -- -- 91 -- 96 % -- --   04/29/23 1525 137/74 -- -- 93 -- 94 % -- --   04/29/23 1451 (!) 143/73 98.5  F (36.9  C) Oral 102 16 98 % 1.803 m (5' 11\") 94.3 kg (208 lb)       PHYSICAL EXAM:   Physical Exam    GENERAL: Awake, alert.  In no acute distress.   HEENT: Normocephalic, atraumatic. Pupils equal, round and reactive. Conjunctiva normal.   NECK: No stridor.  PULMONARY: Symmetrical breath sounds without distress.  Lungs clear to auscultation bilaterally without wheezes, rhonchi or rales.  CARDIO: Borderline tachycardic rate with regular rhythm.  No significant murmur, rub or gallop.  Radial pulses strong and symmetrical.  ABDOMINAL: Abdomen soft, non-distended and non-tender to palpation.    EXTREMITIES: No lower extremity swelling or edema.      NEURO: Alert and oriented to person, place and time.  Cranial nerves grossly intact.  No focal motor deficit.  PSYCH: Normal mood and affect.      Results     LAB:  All pertinent labs reviewed and interpreted  Labs Ordered and Resulted from Time of ED Arrival to Time of ED Departure   CBC WITH PLATELETS - Abnormal       Result Value    WBC Count 4.3      RBC Count 4.99      Hemoglobin 14.9      Hematocrit 44.1      MCV 88      MCH 29.9      MCHC 33.8      RDW 12.9      Platelet Count 111 (*)    BASIC METABOLIC PANEL - Abnormal    Sodium 135 (*)     Potassium 4.0      Chloride 100      Carbon Dioxide (CO2) 22      Anion Gap 13      " Urea Nitrogen 11.4      Creatinine 1.37 (*)     Calcium 8.9      Glucose 117 (*)     GFR Estimate 61     TROPONIN T, HIGH SENSITIVITY - Normal    Troponin T, High Sensitivity 22     NT PROBNP INPATIENT - Normal    N terminal Pro BNP Inpatient 850     INFLUENZA A/B, RSV, & SARS-COV2 PCR - Normal    Influenza A PCR Negative      Influenza B PCR Negative      RSV PCR Negative      SARS CoV2 PCR Negative     LACTIC ACID WHOLE BLOOD - Normal    Lactic Acid 1.4     D DIMER QUANTITATIVE - Normal    D-Dimer Quantitative <0.27         RADIOLOGY:  Chest XR,  PA & LAT   Final Result   IMPRESSION: Low lung volumes. Stable mild left basilar scarring. Mild medial left lower lobe airspace opacities which may reflect atelectasis or pneumonic infiltrates. The right lung is clear. No pleural effusion. Normal heart size.          EC2023, 15:12; NSR with rate of 90 bpm; normal intervals; normal conduction; lateral T wave inversions with no ST-T wave elevation consistent with ACS or pericarditis; compared to previous EKG dated 3/31/2016, there are no significant changes    EKG independently reviewed and interpreted by Angélica Gaitan MD      I, Shahnaz Mosley, am serving as a scribe to document services personally performed by Angélica Gaitan MD based on my observation and the provider's statements to me. I, Angélica Gaitan MD attest that Shahnaz Mosley is acting in a scribe capacity, has observed my performance of the services and has documented them in accordance with my direction.    Angélica Gaitan MD  Emergency Medicine  [facility] EMERGENCY DEPARTMENT           Angélica Gatian MD  23 [phone]

## 2023-04-29 NOTE — DISCHARGE INSTRUCTIONS
Please follow-up with your cardiologist at the [facility]. You should have Holter monitoring before that follow-up appointment. Call the Central Appointment line ([phone]) to arrange for your appointment and they will facilitate the Holter monitor at that time.    Please follow-up with your Primary Care Provider within one week for a recheck; call to arrange appointment.    Return to the ER for worsening symptoms, worsening cough, if you develop chest pain, shortness of breath, if you pass out again or feel that you might, persistent nausea / vomiting, persistent fevers or other concerns.    Complete the full course of Levaquin, even if you are feeling better. SEE BELOW:    This medicine has been associated with rare cases of tendinitis (inflammation of your tendons) and tendon rupture. This risk increases if you are also taking steroids.  Please stop this medicine and call your primary care provider if you develop joint pain, muscle aches, difficulty walking, or swelling in your arms or legs.    Rare, but possible, side effects of this medication also include changes in mood, hallucinations, difficulty sleeping and confusion.    Avoid taking this medication with milk, dairy products, or calcium-fortified juices or within 2 hours of iron or calcium supplementation.  Use sunscreen and avoid excessive sun exposure while taking this medicine because you are at higher risk of sunburn.      If you are diabetic, this medication could increase or decrease your blood sugars.  Please monitor your blood sugar closely while on this antibiotic.

## 2023-05-01 LAB
ATRIAL RATE - MUSE: 90 BPM
DIASTOLIC BLOOD PRESSURE - MUSE: NORMAL MMHG
INTERPRETATION ECG - MUSE: NORMAL
P AXIS - MUSE: 62 DEGREES
PR INTERVAL - MUSE: 180 MS
QRS DURATION - MUSE: 92 MS
QT - MUSE: 372 MS
QTC - MUSE: 455 MS
R AXIS - MUSE: 45 DEGREES
SYSTOLIC BLOOD PRESSURE - MUSE: NORMAL MMHG
T AXIS - MUSE: 110 DEGREES
VENTRICULAR RATE- MUSE: 90 BPM

## 2023-05-11 ENCOUNTER — OFFICE VISIT (OUTPATIENT)
Dept: FAMILY MEDICINE | Facility: CLINIC | Age: 58
End: 2023-05-11
Payer: COMMERCIAL

## 2023-05-11 VITALS
TEMPERATURE: 97.8 F | HEIGHT: 71 IN | DIASTOLIC BLOOD PRESSURE: 70 MMHG | HEART RATE: 70 BPM | OXYGEN SATURATION: 98 % | RESPIRATION RATE: 21 BRPM | WEIGHT: 199 LBS | BODY MASS INDEX: 27.86 KG/M2 | SYSTOLIC BLOOD PRESSURE: 110 MMHG

## 2023-05-11 DIAGNOSIS — R55 SYNCOPE, UNSPECIFIED SYNCOPE TYPE: ICD-10-CM

## 2023-05-11 DIAGNOSIS — D69.6 THROMBOCYTOPENIA (H): ICD-10-CM

## 2023-05-11 DIAGNOSIS — M1A.9XX0 CHRONIC GOUT WITHOUT TOPHUS, UNSPECIFIED CAUSE, UNSPECIFIED SITE: ICD-10-CM

## 2023-05-11 DIAGNOSIS — I42.2 APICAL VARIANT HYPERTROPHIC CARDIOMYOPATHY (H): ICD-10-CM

## 2023-05-11 DIAGNOSIS — J18.9 COMMUNITY ACQUIRED PNEUMONIA OF LEFT LUNG, UNSPECIFIED PART OF LUNG: Primary | ICD-10-CM

## 2023-05-11 PROCEDURE — 99214 OFFICE O/P EST MOD 30 MIN: CPT | Performed by: FAMILY MEDICINE

## 2023-05-11 ASSESSMENT — PAIN SCALES - GENERAL: PAINLEVEL: NO PAIN (0)

## 2023-05-11 NOTE — PROGRESS NOTES
Assessment/Plan:    Community acquired pneumonia of left lung, unspecified part of lung  Left lower lobe community-acquired pneumonia reviewed.  Completed levofloxacin 750 mg daily x7 days.  Symptomatic improvement described.  Has Mucinex available on as-needed basis.    Syncope, unspecified syncope type  Syncopal episode occurring April 28, 2023.  No recurrent concerns with history of apical variant hypertrophic cardiomyopathy.  Does have scheduled follow-up through [facility] around July 31, 2023.  We will complete Holter monitor upcoming to ensure no tacky bradycardia arrhythmia etc.  Lab evaluation through [facility] emergency department with normal D-dimer, lactic acid, influenza, RSV and COVID testing.  BNP levels normal as well as troponin T.  Creatinine 1.37 with GFR 61 with likely component of prerenal azotemia.  Sodium level 135 without concerns for profound weakness, confusion etc.    Apical variant hypertrophic cardiomyopathy (H)  Known history of apical variant hypertrophic cardiomyopathy does have scheduled follow-up through [facility] around July 31, 2023    Chronic gout without tophus, unspecified cause, unspecified site  Denies recent gout flare.  Has colchicine available on as-needed basis    Thrombocytopenia (H)  History of chronic mild thrombocytopenia reviewed.  No bleeding issues identified.  Most recent platelet count 111,000.        Subjective:    Gagan Mejía is seen today for follow-up ER visit.  Had been seen April 29, 2023 through [facility] emergency department after apparent syncopal episode the day prior as well as persisting cough.  Was noted to have left lower lobe community-acquired pneumonia likely.  Treated with levofloxacin 750 mg daily x7 days and has seen symptomatic improvement and uses Mucinex on as-needed basis currently.  Preceding ER assessment had described syncopal episode with perhaps 10 seconds loss of consciousness after sensation of room darkening immediately  "prior while eating pizza with his son.  Was able to attend the [email or web address] game later that evening.  Had only approximately 2 and half hours of sleep the night before since being up trying to buy tickets to watch the wild play next season in [facility].  Patient does have underlying history of apical variant hypertrophic cardiomyopathy and is followed through [facility].  We will have upcoming Holter monitor following syncopal episode as well as full cardiac assessment through [facility] around July 31, 2023.  Comprehensive review of systems as above otherwise all negative.      58 year old male, history of hypertrophic cardiomyopathy, HLD, JENNIFER and gout, who presents for evaluation of a cough and syncope.     He reports cough and congestion x 1 week with cough productive of green sputum starting today. He had chills last night and a fever to 102 today. He also reports decreased appetite this week, but denies abdominal pain, N/V/D.      Throughout the week, he also has had mild episodes of lightheadedness. Then last night while eating dinner he had sudden onset of lightheadedness and proceeded to have a syncopal episode. He regained consciousness after ~10 seconds. He reports intermittent chest \"cramps\", but reports this is not new for him. No shortness of breath.      He has followed with Cardiology at [facility], but has not seen them since prior to the pandemic.     On exam, he has borderline tachycardic rate with regular rhythm and clear, symmetrical breath sounds.     Patient placed on cardiac monitor, IV access established and blood sent for labs.     EKG performed and demonstrated NSR with lateral T wave inversions (similar to prior EKG) and no ST-T wave elevation consistent with ACS.  Troponin at upper limits of normal (22).     PE considered.  Patient low probability for which a D-dimer was checked and negative (<0.27).  Risks of CTA chest felt to outweigh benefit.  CXR performed and demonstrated low lung " volumes with stable mild left basilar scarring. Mild medial left lower lobe airspace opacities may reflect atelectasis or pneumonic infiltrates. The right lung is clear. No pleural effusion. Normal heart size. Given cough x one week with fever, will treat with antibiotics.      No clinical, radiographic or laboratory () evidence of acute CHF.     His COVID, influenza and RSV tests were negative.  He has no leukocytosis (WBC 4.3) with normal lactate (1.4) and I do not suspect sepsis.     Labs otherwise remarkable for mild renal insufficiency (creatinine 1.37 with GFR 61); consider component of dehydration for which he was given IV fluids.      Patient will need Cardiology follow-up and he prefers to continue seeing Dr. Dorantes with [facility]. I consulted with the Cardiologist on-call at [facility] regarding their recommendations for discharge vs admission and, given reassuring evaluation, they advise discharge with outpatient Holter monitoring and follow-up with Dr. Dorantes. Patient to call Central Appointment Line (he was given their phone number) on Monday to arrange for Holter monitor and to schedule an appointment.    Patient is comfortable with discharge to home. He was given a prescription for Levaquin (penicillin allergy); given the first dose po in the ED. return precautions provided.  Patient stable throughout ED course.        At the conclusion of the encounter I discussed the results of all the tests and the disposition. The questions were answered. The patient and family acknowledged understanding and were agreeable with the care plan.      Past Surgical History:   Procedure Laterality Date     ARTHROSCOPY KNEE       ARTHROSCOPY SHOULDER ROTATOR CUFF REPAIR Right      LASIK          Family History   Problem Relation Age of Onset     No Known Problems Mother      Syncope Father      No Known Problems Son      Sleep Apnea Brother      No Known Problems Brother      No Known Problems Brother      No  "Known Problems Sister      No Known Problems Sister         Past Medical History:   Diagnosis Date     Apical variant hypertrophic cardiomyopathy (H) 4/3/2016     JENNIFER on CPAP 11/2015     Rotator cuff tear, right         Social History     Tobacco Use     Smoking status: Never     Smokeless tobacco: Never   Substance Use Topics     Alcohol use: Yes     Comment: Alcoholic Drinks/day: 1-2 times per week     Drug use: No        Current Outpatient Medications   Medication Sig Dispense Refill     multivitamin (ONE A DAY) per tablet [MULTIVITAMIN (ONE A DAY) PER TABLET] Take 1 tablet by mouth daily.       colchicine (COLCYRS) 0.6 MG tablet Take 1 tablet (0.6 mg) by mouth 2 times daily (Patient not taking: Reported on 5/11/2023) 60 tablet 0          Objective:    Vitals:    05/11/23 1305   BP: 110/70   Pulse: 70   Resp: 21   Temp: 97.8  F (36.6  C)   SpO2: 98%   Weight: 90.3 kg (199 lb)   Height: 1.797 m (5' 10.75\")      Body mass index is 27.95 kg/m .    Alert.  No apparent distress.  Cooperative and forthcoming.  No significant cough etc.  No respiratory distress.  Chest appears relatively clear to auscultation with symmetric expansion.  No expiratory wheeze.  Cardiac exam appears regular rate and rhythm without significant cardiac ectopy.  Extremities warm and dry.  No rash.  No peripheral edema findings.      EXAM: XR CHEST 2 VIEWS  LOCATION: [facility]  DATE/TIME: 4/29/2023 4:12 PM CDT     INDICATION: cough, syncope  COMPARISON: Chest x-ray and CT chest 08/04/2021                                           IMPRESSION: Low lung volumes. Stable mild left basilar scarring. Mild medial left lower lobe airspace opacities which may reflect atelectasis or pneumonic infiltrates. The right lung is clear. No pleural effusion. Normal heart size.        This note has been dictated using voice recognition software and as a result may contain minor grammatical errors and unintended word substitutions. "       Answers for HPI/ROS submitted by the patient on 5/4/2023  What is the reason for your visit today? : follow up from my Apr 29 ER visit  How many servings of fruits and vegetables do you eat daily?: 2-3  On average, how many sweetened beverages do you drink each day (Examples: soda, juice, sweet tea, etc.  Do NOT count diet or artificially sweetened beverages)?: 1  How many minutes a day do you exercise enough to make your heart beat faster?: 30 to 60  How many days a week do you exercise enough to make your heart beat faster?: 4  How many days per week do you miss taking your medication?: 0

## 2023-09-12 ASSESSMENT — ENCOUNTER SYMPTOMS
HEARTBURN: 0
PALPITATIONS: 0
NAUSEA: 0
HEADACHES: 0
PARESTHESIAS: 0
MYALGIAS: 0
HEMATURIA: 0
DIZZINESS: 0
WEAKNESS: 0
DIARRHEA: 0
SORE THROAT: 0
ARTHRALGIAS: 0
HEMATOCHEZIA: 0
ABDOMINAL PAIN: 0
NERVOUS/ANXIOUS: 0
EYE PAIN: 0
COUGH: 0
CHILLS: 0
DYSURIA: 0
FREQUENCY: 0
SHORTNESS OF BREATH: 0
CONSTIPATION: 0
JOINT SWELLING: 0
FEVER: 0

## 2023-09-15 ENCOUNTER — OFFICE VISIT (OUTPATIENT)
Dept: FAMILY MEDICINE | Facility: CLINIC | Age: 58
End: 2023-09-15
Payer: COMMERCIAL

## 2023-09-15 VITALS
WEIGHT: 197 LBS | TEMPERATURE: 97.6 F | BODY MASS INDEX: 27.58 KG/M2 | OXYGEN SATURATION: 97 % | RESPIRATION RATE: 14 BRPM | HEIGHT: 71 IN | DIASTOLIC BLOOD PRESSURE: 60 MMHG | SYSTOLIC BLOOD PRESSURE: 100 MMHG | HEART RATE: 75 BPM

## 2023-09-15 DIAGNOSIS — E66.3 OVERWEIGHT: ICD-10-CM

## 2023-09-15 DIAGNOSIS — Z00.00 ROUTINE PHYSICAL EXAMINATION: Primary | ICD-10-CM

## 2023-09-15 DIAGNOSIS — I42.2 APICAL VARIANT HYPERTROPHIC CARDIOMYOPATHY (H): ICD-10-CM

## 2023-09-15 DIAGNOSIS — D69.6 THROMBOCYTOPENIA (H): ICD-10-CM

## 2023-09-15 DIAGNOSIS — G47.33 OSA (OBSTRUCTIVE SLEEP APNEA): ICD-10-CM

## 2023-09-15 DIAGNOSIS — Z23 ENCOUNTER FOR IMMUNIZATION: ICD-10-CM

## 2023-09-15 DIAGNOSIS — Z12.5 SCREENING FOR PROSTATE CANCER: ICD-10-CM

## 2023-09-15 DIAGNOSIS — M10.072 ACUTE IDIOPATHIC GOUT OF LEFT FOOT: ICD-10-CM

## 2023-09-15 DIAGNOSIS — Z11.59 NEED FOR HEPATITIS C SCREENING TEST: ICD-10-CM

## 2023-09-15 DIAGNOSIS — Z01.84 IMMUNITY STATUS TESTING: ICD-10-CM

## 2023-09-15 LAB
ALBUMIN SERPL BCG-MCNC: 4.5 G/DL (ref 3.5–5.2)
ALP SERPL-CCNC: 69 U/L (ref 40–129)
ALT SERPL W P-5'-P-CCNC: 21 U/L (ref 0–70)
ANION GAP SERPL CALCULATED.3IONS-SCNC: 11 MMOL/L (ref 7–15)
AST SERPL W P-5'-P-CCNC: 34 U/L (ref 0–45)
BILIRUB SERPL-MCNC: 0.3 MG/DL
BUN SERPL-MCNC: 14.3 MG/DL (ref 6–20)
CALCIUM SERPL-MCNC: 9.2 MG/DL (ref 8.6–10)
CHLORIDE SERPL-SCNC: 109 MMOL/L (ref 98–107)
CHOLEST SERPL-MCNC: 155 MG/DL
CREAT SERPL-MCNC: 1.01 MG/DL (ref 0.67–1.17)
DEPRECATED HCO3 PLAS-SCNC: 24 MMOL/L (ref 22–29)
EGFRCR SERPLBLD CKD-EPI 2021: 87 ML/MIN/1.73M2
ERYTHROCYTE [DISTWIDTH] IN BLOOD BY AUTOMATED COUNT: 13 % (ref 10–15)
GLUCOSE SERPL-MCNC: 104 MG/DL (ref 70–99)
HCT VFR BLD AUTO: 45.6 % (ref 40–53)
HDLC SERPL-MCNC: 49 MG/DL
HGB BLD-MCNC: 15.6 G/DL (ref 13.3–17.7)
LDLC SERPL CALC-MCNC: 94 MG/DL
MCH RBC QN AUTO: 30.6 PG (ref 26.5–33)
MCHC RBC AUTO-ENTMCNC: 34.2 G/DL (ref 31.5–36.5)
MCV RBC AUTO: 90 FL (ref 78–100)
NONHDLC SERPL-MCNC: 106 MG/DL
PLATELET # BLD AUTO: 150 10E3/UL (ref 150–450)
POTASSIUM SERPL-SCNC: 4.5 MMOL/L (ref 3.4–5.3)
PROT SERPL-MCNC: 6.4 G/DL (ref 6.4–8.3)
PSA SERPL DL<=0.01 NG/ML-MCNC: 3.49 NG/ML (ref 0–3.5)
RBC # BLD AUTO: 5.09 10E6/UL (ref 4.4–5.9)
SODIUM SERPL-SCNC: 144 MMOL/L (ref 136–145)
TRIGL SERPL-MCNC: 58 MG/DL
WBC # BLD AUTO: 4.1 10E3/UL (ref 4–11)

## 2023-09-15 PROCEDURE — 86706 HEP B SURFACE ANTIBODY: CPT | Performed by: FAMILY MEDICINE

## 2023-09-15 PROCEDURE — 90677 PCV20 VACCINE IM: CPT | Performed by: FAMILY MEDICINE

## 2023-09-15 PROCEDURE — 90471 IMMUNIZATION ADMIN: CPT | Performed by: FAMILY MEDICINE

## 2023-09-15 PROCEDURE — 36415 COLL VENOUS BLD VENIPUNCTURE: CPT | Performed by: FAMILY MEDICINE

## 2023-09-15 PROCEDURE — 90682 RIV4 VACC RECOMBINANT DNA IM: CPT | Performed by: FAMILY MEDICINE

## 2023-09-15 PROCEDURE — 80061 LIPID PANEL: CPT | Performed by: FAMILY MEDICINE

## 2023-09-15 PROCEDURE — G0103 PSA SCREENING: HCPCS | Performed by: FAMILY MEDICINE

## 2023-09-15 PROCEDURE — 86803 HEPATITIS C AB TEST: CPT | Performed by: FAMILY MEDICINE

## 2023-09-15 PROCEDURE — 80053 COMPREHEN METABOLIC PANEL: CPT | Performed by: FAMILY MEDICINE

## 2023-09-15 PROCEDURE — 90472 IMMUNIZATION ADMIN EACH ADD: CPT | Performed by: FAMILY MEDICINE

## 2023-09-15 PROCEDURE — 85027 COMPLETE CBC AUTOMATED: CPT | Performed by: FAMILY MEDICINE

## 2023-09-15 PROCEDURE — 99396 PREV VISIT EST AGE 40-64: CPT | Mod: 25 | Performed by: FAMILY MEDICINE

## 2023-09-15 RX ORDER — INDOMETHACIN 50 MG/1
50 CAPSULE ORAL
COMMUNITY
Start: 2022-12-12 | End: 2023-09-15

## 2023-09-15 RX ORDER — INDOMETHACIN 50 MG/1
50 CAPSULE ORAL
Qty: 15 CAPSULE | Refills: 3 | Status: SHIPPED | OUTPATIENT
Start: 2023-09-15 | End: 2023-09-20

## 2023-09-15 ASSESSMENT — PAIN SCALES - GENERAL: PAINLEVEL: NO PAIN (0)

## 2023-09-15 NOTE — PROGRESS NOTES
Assessment/Plan:     Routine physical examination  Routine healthcare maintenance.  Preventative cares reviewed.  Harika physical exams to continue.    Overweight  Overweight status with BMI 27.48 maintaining weight goal less than 195 pounds initially, less than 190 pounds ideally.    Apical variant hypertrophic cardiomyopathy (H)  Apical variant hypertrophic cardiomyopathy and is scheduled to see cardiology at [facility] on Monday, September 18, 2023 for subcutaneous ICD placement likely in future.  Genetic testing was negative apparently.  - Comprehensive metabolic panel  - Lipid panel reflex to direct LDL Fasting    Need for hepatitis C screening test  Routine hepatitis C screen based on age criteria.  - Hepatitis C Screen Reflex to HCV RNA Quant and Genotype    Acute idiopathic gout of left foot  Utilizes colchicine 0.6 mg twice daily as needed otherwise does request refill of indomethacin which typically helps improve symptoms within 2 tablets.  - indomethacin (INDOCIN) 50 MG capsule  Dispense: 15 capsule; Refill: 3    JENNIFER (obstructive sleep apnea)  JENNIFER, mild.  Not requiring oral apparatus.  Does have CPAP but not utilizing.    Encounter for immunization  Pneumococcal 20 vaccine and Flublok immunizations provided.  - Pneumococcal 20 Valent Conjugate (Prevnar 20)  - INFLUENZA VACCINE 18-64Y (FLUBLOK)    Immunity status testing  Otitis B surface antibody to be completed for immunity status testing.  - Hepatitis B Surface Antibody    Thrombocytopenia (H)  Thrombocytopenia.  Platelet count 111,000 previously and will update CBC.  - Comprehensive metabolic panel  - CBC with platelets    Screening for prostate cancer  PSA for prostate cancer screening based on age criteria.  - Prostate Specific Antigen Screen                 Subjective:     Gagan Mejía is a 58 year old male who presents for an annual exam.  In general doing well.  Apical variant hypertrophic cardiomyopathy.  Follows with cardiologist at [facility] and is scheduled to be seen on Monday, September 18, 2023 for consideration of subcutaneous ICD.  Patient with prior syncopal episode associated with pneumonia however cardiologist felt that it may be related to more than just being dehydrated at that time.  Otherwise has normal exercise tolerance currently without orthopnea or PND symptoms.  No recent gout flare however always has slight redness to the right first MTP joint without tenderness.  Has had mild thrombocytopenia in the past with platelet count 111,000.  JENNIFER, mild.  Has CPAP but does not utilize.  Comprehensive review of systems as above otherwise all negative.       - \"Lolly\"   S.O. 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 Rossi); right shoulder arthroscopy 3/31/16 (Dr. Bales) for subacromial decompression and partial thickness tear repair of supraspinatus)  Pneumonia LLL - 2005     CPAP (variable) ~ 8 (starts at 5 and goes up to 8-10)      Healthy Habits:     Getting at least 3 servings of Calcium per day:  NO    Bi-annual eye exam:  Yes    Dental care twice a year:  Yes    Sleep apnea or symptoms of sleep apnea:  Sleep apnea    Diet:  Regular (no restrictions)    Frequency of exercise:  2-3 days/week    Duration of exercise:  15-30 minutes    Taking medications regularly:  Yes    Medication side effects:  None    Additional concerns today:  No       Immunization History   Administered Date(s) Administered    COVID-19 MONOVALENT 12+ (Pfizer) 03/25/2021, 04/19/2021    DT (PEDS <7y) 05/17/2002    FLU 6-35 months 10/14/2009    Flu, Unspecified 10/01/2015    [id number] Novel Flu- Nasal 11/24/2009    Influenza (High Dose) 3 valent vaccine 10/01/2015    " Influenza (IIV3) PF 10/13/2010, 10/13/2011, 09/25/2012, 10/08/2013, 09/29/2021    Influenza Vaccine 18-64 (Flublok) 09/15/2023    Influenza Vaccine >6 months (Alfuria,Fluzone) 10/19/2018, 10/26/2020    Influenza Vaccine, 6+MO IM (QUADRIVALENT W/PRESERVATIVES) 10/19/2016, 10/21/2019    Pneumococcal 20 valent Conjugate (Prevnar 20) 09/15/2023    Pneumococcal 23 valent 10/19/2016    TD,PF 7+ (Tenivac) 05/17/2002, 06/04/2020    TDAP (Adacel,Boostrix) 07/02/2009     Immunization status: Pneumococcal 20 vaccination and Flublok immunization provided today.    Current Outpatient Medications   Medication Sig Dispense Refill    indomethacin (INDOCIN) 50 MG capsule Take 1 capsule (50 mg) by mouth 3 times daily (with meals) for 5 days 15 capsule 3    multivitamin (ONE A DAY) per tablet [MULTIVITAMIN (ONE A DAY) PER TABLET] Take 1 tablet by mouth daily.      colchicine (COLCYRS) 0.6 MG tablet Take 1 tablet (0.6 mg) by mouth 2 times daily (Patient not taking: Reported on 5/11/2023) 60 tablet 0     Past Medical History:   Diagnosis Date    Apical variant hypertrophic cardiomyopathy (H) 4/3/2016    JENNIFER on CPAP 11/2015    Rotator cuff tear, right      Past Surgical History:   Procedure Laterality Date    ARTHROSCOPY KNEE      ARTHROSCOPY SHOULDER ROTATOR CUFF REPAIR Right     LASIK       Penicillins  Family History   Problem Relation Age of Onset    No Known Problems Mother     Syncope Father     No Known Problems Son     Sleep Apnea Brother     No Known Problems Brother     No Known Problems Brother     No Known Problems Sister     No Known Problems Sister      Social History     Socioeconomic History    Marital status:      Spouse name: Not on file    Number of children: 1    Years of education: Not on file    Highest education level: Not on file   Occupational History    Not on file   Tobacco Use    Smoking status: Never    Smokeless tobacco: Never   Substance and Sexual Activity    Alcohol use: Yes     Comment: Alcoholic  "Drinks/day: 1-2 times per week    Drug use: No    Sexual activity: Not on file   Other Topics Concern    Not on file   Social History Narrative    Not on file     Social Determinants of Health     Financial Resource Strain: Not on file   Food Insecurity: Not on file   Transportation Needs: Not on file   Physical Activity: Not on file   Stress: Not on file   Social Connections: Not on file   Intimate Partner Violence: Not on file   Housing Stability: Not on file       Review of Systems  Comprehensive ROS: as above, otherwise all negative.           Objective:     /60   Pulse 75   Temp 97.6  F (36.4  C)   Resp 14   Ht 1.803 m (5' 11\")   Wt 89.4 kg (197 lb)   SpO2 97%   BMI 27.48 kg/m    Body mass index is 27.48 kg/m .    Physical    General Appearance:    Alert, cooperative, no distress, appears stated age.  BMI 27.48.   Head:    Normocephalic, without obvious abnormality, atraumatic   Eyes:    PERRL, conjunctiva/corneas clear, EOM's intact, fundi     benign, both eyes        Ears:    Normal TM's and external ear canals, both ears   Nose:   Nares normal, septum midline, mucosa normal, no drainage    or sinus tenderness   Throat:   Lips, mucosa, and tongue normal; teeth and gums normal   Neck:   Supple, symmetrical, trachea midline, no adenopathy;        thyroid:  No enlargement/tenderness/nodules; no carotid    bruit or JVD   Back:     Symmetric, no curvature, ROM normal, no CVA tenderness   Lungs:     Clear to auscultation bilaterally, respirations unlabored   Chest wall:    No tenderness or deformity   Heart:    Regular rate and rhythm, S1 and S2 normal, no murmur, rub   or gallop   Abdomen:     Soft, non-tender, bowel sounds active all four quadrants,     no masses, no organomegaly.     Genitalia:    Normal male without lesion, discharge or tenderness.  No inguinal hernia noted.     Rectal:    Normal tone.  Prostate normal/symmetric, no masses or tenderness.   Extremities:   Extremities normal, " atraumatic, no cyanosis or edema   Pulses:   2+ and symmetric all extremities   Skin:   Skin color, texture, turgor normal, no rashes or lesions   Lymph nodes:   Cervical, supraclavicular, and axillary nodes normal   Neurologic:   CNII-XII intact. Normal strength, sensation and reflexes       throughout            MR Cardiac without and with IV Contrast (7/31/23)  Order: [id number]  Impression    1. Findings consistent with hypertrophic cardiomyopathy with an apical pouch.  Maximal wall  thickness 20 mm in the mid anterior wall.    2. Cannot rule out tiny thrombus in the apical pouch.    3. Diffuse, near transmural late gadolinium enhancement in the mid to apical left ventricular  myocardium, extend greater than 15%.  4. No evidence of left ventricular outflow obstruction or systolic anterior motion of the mitral  valve.  5. Normal biventricular size and systolic function. LVEF 62%. RVEF 65%.          This note has been dictated using voice recognition software and as a result may contain minor grammatical errors and unintended word substitutions.       Answers submitted by the patient for this visit:  Annual Preventive Visit (Submitted on 9/12/2023)  Chief Complaint: Annual Exam:  Frequency of exercise:: 2-3 days/week  Getting at least 3 servings of Calcium per day:: NO  Diet:: Regular (no restrictions)  Taking medications regularly:: Yes  Medication side effects:: None  Bi-annual eye exam:: Yes  Dental care twice a year:: Yes  Sleep apnea or symptoms of sleep apnea:: Sleep apnea  abdominal pain: No  Blood in stool: No  Blood in urine: No  chest pain: No  chills: No  congestion: No  constipation: No  cough: No  diarrhea: No  dizziness: No  ear pain: No  eye pain: No  nervous/anxious: No  fever: No  frequency: No  genital sores: No  headaches: No  hearing loss: No  heartburn: No  arthralgias: No  joint swelling: No  peripheral edema: No  mood changes: No  myalgias: No  nausea: No  dysuria: No  palpitations: No  Skin  sensation changes: No  sore throat: No  urgency: No  rash: No  shortness of breath: No  visual disturbance: No  weakness: No  impotence: No  penile discharge: No  Additional concerns today:: No  Exercise outside of work (Submitted on 9/12/2023)  Chief Complaint: Annual Exam:  Duration of exercise:: 15-30 minutes

## 2023-09-19 LAB
HBV SURFACE AB SERPL IA-ACNC: 0 M[IU]/ML
HBV SURFACE AB SERPL IA-ACNC: NONREACTIVE M[IU]/ML
HCV AB SERPL QL IA: NONREACTIVE

## 2023-11-08 NOTE — ADDENDUM NOTE
Addendum Note by Shayne Ross CLS at 6/4/2020 10:00 AM     Author: Shayne Ross CLS Service: -- Author Type:     Filed: 6/4/2020  6:09 PM Encounter Date: 6/4/2020 Status: Signed    : Shayne Ross CLS ()    Addended by: SHAYNE ROSS on: 6/4/2020 06:09 PM        Modules accepted: Orders        
seen with acp  suddent right calf pain after running  PE no swelling mild tenderness right calf   likely muscle tear hx not compatible with dvt  agree with acps assessment hx and physical and disposition

## 2023-12-11 ENCOUNTER — OFFICE VISIT (OUTPATIENT)
Dept: CARDIOLOGY | Facility: CLINIC | Age: 58
End: 2023-12-11
Attending: INTERNAL MEDICINE
Payer: COMMERCIAL

## 2023-12-11 VITALS
RESPIRATION RATE: 16 BRPM | BODY MASS INDEX: 29.12 KG/M2 | SYSTOLIC BLOOD PRESSURE: 102 MMHG | HEART RATE: 65 BPM | WEIGHT: 208 LBS | DIASTOLIC BLOOD PRESSURE: 74 MMHG | HEIGHT: 71 IN

## 2023-12-11 DIAGNOSIS — E78.2 MIXED HYPERLIPIDEMIA: ICD-10-CM

## 2023-12-11 DIAGNOSIS — I42.2 HYPERTROPHIC CARDIOMYOPATHY (H): Primary | ICD-10-CM

## 2023-12-11 DIAGNOSIS — I47.10 PAROXYSMAL SUPRAVENTRICULAR TACHYCARDIA (H): ICD-10-CM

## 2023-12-11 DIAGNOSIS — Z95.810 ICD (IMPLANTABLE CARDIOVERTER-DEFIBRILLATOR) IN PLACE: ICD-10-CM

## 2023-12-11 DIAGNOSIS — G47.33 OSA (OBSTRUCTIVE SLEEP APNEA): ICD-10-CM

## 2023-12-11 DIAGNOSIS — I42.9 SECONDARY CARDIOMYOPATHY (H): Primary | ICD-10-CM

## 2023-12-11 PROCEDURE — 99205 OFFICE O/P NEW HI 60 MIN: CPT | Performed by: INTERNAL MEDICINE

## 2023-12-11 NOTE — LETTER
12/11/2023    Luciano Nicholas MD  [address]  [facility] [address]    RE: Gagan Mejía       Dear Colleague,     I had the pleasure of seeing Gagan Mejía in the [facility].      Click to link to [facility] HEART CARE NOTE    Thank you, Dr. Nicholas, for asking me to see Gagan Mejía in consultation at [facility] to establish cardiology care for hypertrophic cardiomyopathy.      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  Clinically he has no symptoms.  He had ICD placement for syncope without the clear etiology.  Continue to monitor.  His genetic test was negative.  His first relatives were screened and negative for hypertrophic cardiomyopathy.  Nonsustained ventricular tachycardia, status post ICD placement: He had ICD placement in July 2023.  His ICD interrogation today was reported 9 beats of nonsustained ventricular tachycardia at 198 bpm on November 21, 2023.  The patient did not have symptoms at that time.  No ICD discharges.  Continue to monitor the patient.  If his nonsustained ventricular tachycardia become more frequent, consider beta-blocker.  Dyslipidemia, obstructive sleep apnea: Lifestyle modification.    His medical records from [facility] are reviewed.  Total 60 minutes were spent in this visit for face to face visit, physical exam, review of current labs/imaging studies, plan for ongoing treatment with >50% spent on counseling and coordination of care as documented in the above mentioned note.      Thank you for the opportunity to be involved in the care of Gagan Mejía. If you have any questions, please feel free to contact me.  I will see the patient again in 6 months and as needed    Much or all of the text in this note was generated through the use of Dragon Dictate voice-to-text software. Errors in spelling or words which seem  out of context are unintentional.   Sound alike errors, in particular, may have escaped editing.       History of Present Illness:   It is my pleasure to see Gagan Mejía at the [facility] clinic for establishing cardiology care for management of hypertrophic cardiomyopathy. Gagan Mejía is a 58 year old male with a medical history of hypertrophic cardiomyopathy combined mid septal and apical variant hypertrophic cardiomyopathy without obstruction, s/p ICD placement, nonsustained ventricular tachycardia, dyslipidemia, obstructive sleep apnea.    The patient had cardiology care at the [facility] with Dr. Alo Dorantes.  Patient lives close to [facility].  He presents to cardiology clinic for establishing cardiology care.    He states that he feels fine.  He denies chest pain, palpitations, shortness of breath on exertion, orthopnea, PND or leg edema.  He had no ICD discharges since ICD implant.  He had occasional lightheadedness dizziness.  His blood pressure and heart rate are in normal range.  Currently she is not on any cardiac medications.    ICD interrogation today was reported normal device function, 1 episode of nonsustained ventricular tachycardia 9 beats at 198 bpm.  No evidence of atrial fibrillation.    Past Medical History:     Patient Active Problem List   Diagnosis    AC (acromioclavicular) arthritis    Acute idiopathic gout of left foot    Apical variant hypertrophic cardiomyopathy (H)    Elevated ALT measurement    Elevated AST (SGOT)    Erectile dysfunction    Herpes zoster without complication    History of herpes zoster    Hyperlipidemia    Musculoskeletal back pain    JENNIFER (obstructive sleep apnea)    Overweight (BMI 25.0-29.9)       Past Surgical History:     Past Surgical History:   Procedure Laterality Date    ARTHROSCOPY KNEE      ARTHROSCOPY SHOULDER ROTATOR CUFF REPAIR Right     LASIK         Family History:     Family History   Problem Relation Age of Onset    No  "Known Problems Mother     Syncope Father     No Known Problems Son     Sleep Apnea Brother     No Known Problems Brother     No Known Problems Brother     No Known Problems Sister     No Known Problems Sister        Social History:    reports that he has never smoked. He has never used smokeless tobacco. He reports current alcohol use. He reports that he does not use drugs.    Review of Systems:   12 systems are reviewed negative except for in HPI.    Meds:     Current Outpatient Medications:     multivitamin (ONE A DAY) per tablet, [MULTIVITAMIN (ONE A DAY) PER TABLET] Take 1 tablet by mouth daily., Disp: , Rfl:     colchicine (COLCYRS) 0.6 MG tablet, Take 1 tablet (0.6 mg) by mouth 2 times daily (Patient not taking: Reported on 5/11/2023), Disp: 60 tablet, Rfl: 0    indomethacin (INDOCIN) 50 MG capsule, Take 1 capsule (50 mg) by mouth 3 times daily (with meals) for 5 days, Disp: 15 capsule, Rfl: 3     Allergies:   Penicillins    Objective:      Physical Exam  94.3 kg (208 lb)  1.803 m (5' 11\")  Body mass index is 29.01 kg/m .  /74 (BP Location: Right arm, Patient Position: Sitting, Cuff Size: Adult Large)   Pulse 65   Resp 16   Ht 1.803 m (5' 11\")   Wt 94.3 kg (208 lb)   BMI 29.01 kg/m      General Appearance:   Awake, Alert, No acute distress.   HEENT:  Pupil equal, reactive to light. No scleral icterus; the mucous membranes were moist. No oral ulcers or thrush.    Neck: No cervical bruits. No JVD. No thyromegaly. No lymph node enlargement or tenderness.   Chest: The spine was straight. The chest was symmetric.   Lungs:   Respirations unlabored. Lungs are clear to auscultation. No crackles. No wheezing.   Cardiovascular:   RRR, normal first and second heart sounds with no murmurs. No rubs or gallops.    Abdomen:  Soft. No tenderness. Non-distended. Bowels sounds are present   Extremities: Equal posterior tibial pulses. No leg edema.   Skin: No rashes or ulcers. Warm, Dry.   Musculoskeletal: No " tenderness. No deformity.   Neurologic: Mood and affect are appropriate. No focal deficits.         ICD interrogation today:  Personally reivewed  Normal device function  1 episode of nonsustained ventricular tachycardia 9 beats at 198 bpm.    Cardiac Imaging Studies  ECHO on 7-:  Hypertrophic cardiomyopathy, mid septal variant. Hypertrophic cardiomyopathy, apical variant.   LEFT VENTRICLE:Normal left ventricular chamber size. Calculated 2-D linear left ventricular ejection fraction 69%. Global averaged left ventricular longitudinal peak systolic strain is abnormal at -10% (normal = more negative than -18%). Severe left   ventricular mid cavity obstruction. Large apical pouch identified with ultrasound enhancing agent. The thickest segment was measured at 22 mm. The thickest wall segment was the mid inferolateral segment. Regional wall motion abnormalities were present   (see wall motion graphics). Normal left ventricular filling pressure.   RIGHT VENTRICLE:Normal right ventricular chamber size. Normal right ventricular systolic function. Estimated right ventricular systolic pressure 27 mmHg (right atrial pressure of 5 mmHg).   ATRIA:Mildly enlarged left atrial size. Left atrial volume index 35 ml/m2. Strain imaging examination performed to assess left atrial function. Global averaged left atrial longitudinal peak systolic strain is abnormal at 26.0% (normal is greater than   35%). Enlarged right atrial size by visual estimate.   CARDIAC VALVES:Trileaflet aortic valve. Normal aortic valve. No aortic valve regurgitation. Normal mitral valve. Trivial mitral valve regurgitation. Normal pulmonary valve. Normal pulmonary valve systolic velocities. Trivial pulmonary valve   regurgitation. Normal tricuspid valve. Trivial tricuspid valve regurgitation.   OTHER ECHO FINDINGS:Normal inferior vena cava size with normal inspiratory collapse (>50%). Normal mid ascending aorta diameter of 29 mm. Upper limit of normal of  the mid ascending aorta, for age, sex and BSA is 40 mm. No abdominal aortic aneurysm.   Normal abdominal aorta Doppler flow pattern. No atrial level shunt by color flow imaging. Tiny  pericardial effusion.     Lab Review   Lab Results   Component Value Date     09/15/2023    CO2 24 09/15/2023    CO2 29 08/19/2020    BUN 14.3 09/15/2023    BUN 12 08/19/2020     Lab Results   Component Value Date    WBC 4.1 09/15/2023    HGB 15.6 09/15/2023    HCT 45.6 09/15/2023    MCV 90 09/15/2023     09/15/2023     Lab Results   Component Value Date    CHOL 155 09/15/2023    TRIG 58 09/15/2023    HDL 49 09/15/2023    LDL 94 09/15/2023     02/25/2014     LDL Cholesterol Calculated   Date Value Ref Range Status   09/15/2023 94 <=100 mg/dL Final     LDL Cholesterol Direct   Date Value Ref Range Status   02/25/2014 118 <130 mg/dL Final                     Thank you for allowing me to participate in the care of your patient.      Sincerely,     Shimon Reed MD     [facility]  cc:   Darin Post MD  [address] [place] [phone]

## 2023-12-11 NOTE — PROGRESS NOTES
Click to link to [facility] HEART [facility] NOTE    Thank you, Dr. Nicholas, for asking me to see Gagan Mejía in consultation at [facility] to establish cardiology care for hypertrophic cardiomyopathy.      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  Clinically he has no symptoms.  He had ICD placement for syncope without the clear etiology.  Continue to monitor.  His genetic test was negative.  His first relatives were screened and negative for hypertrophic cardiomyopathy.  Nonsustained ventricular tachycardia, status post ICD placement: He had ICD placement in July 2023.  His ICD interrogation today was reported 9 beats of nonsustained ventricular tachycardia at 198 bpm on November 21, 2023.  The patient did not have symptoms at that time.  No ICD discharges.  Continue to monitor the patient.  If his nonsustained ventricular tachycardia become more frequent, consider beta-blocker.  Dyslipidemia, obstructive sleep apnea: Lifestyle modification.    His medical records from [facility] are reviewed.  Total 60 minutes were spent in this visit for face to face visit, physical exam, review of current labs/imaging studies, plan for ongoing treatment with >50% spent on counseling and coordination of care as documented in the above mentioned note.      Thank you for the opportunity to be involved in the care of Gagan Mejía. If you have any questions, please feel free to contact me.  I will see the patient again in 6 months and as needed    Much or all of the text in this note was generated through the use of Dragon Dictate voice-to-text software. Errors in spelling or words which seem out of context are unintentional.   Sound alike errors, in particular, may have escaped editing.       History of Present Illness:   It is my pleasure to see Gagan Mejía at the [facility]  clinic for establishing cardiology care for management of hypertrophic cardiomyopathy. Gagan Mejía is a 58 year old male with a medical history of hypertrophic cardiomyopathy combined mid septal and apical variant hypertrophic cardiomyopathy without obstruction, s/p ICD placement, nonsustained ventricular tachycardia, dyslipidemia, obstructive sleep apnea.    The patient had cardiology care at the [facility] with Dr. Alo Dorantes.  Patient lives close to [facility].  He presents to cardiology clinic for establishing cardiology care.    He states that he feels fine.  He denies chest pain, palpitations, shortness of breath on exertion, orthopnea, PND or leg edema.  He had no ICD discharges since ICD implant.  He had occasional lightheadedness dizziness.  His blood pressure and heart rate are in normal range.  Currently she is not on any cardiac medications.    ICD interrogation today was reported normal device function, 1 episode of nonsustained ventricular tachycardia 9 beats at 198 bpm.  No evidence of atrial fibrillation.    Past Medical History:     Patient Active Problem List   Diagnosis    AC (acromioclavicular) arthritis    Acute idiopathic gout of left foot    Apical variant hypertrophic cardiomyopathy (H)    Elevated ALT measurement    Elevated AST (SGOT)    Erectile dysfunction    Herpes zoster without complication    History of herpes zoster    Hyperlipidemia    Musculoskeletal back pain    JENNIFER (obstructive sleep apnea)    Overweight (BMI 25.0-29.9)       Past Surgical History:     Past Surgical History:   Procedure Laterality Date    ARTHROSCOPY KNEE      ARTHROSCOPY SHOULDER ROTATOR CUFF REPAIR Right     LASIK         Family History:     Family History   Problem Relation Age of Onset    No Known Problems Mother     Syncope Father     No Known Problems Son     Sleep Apnea Brother     No Known Problems Brother     No Known Problems Brother     No Known Problems Sister     No Known Problems Sister   "      Social History:    reports that he has never smoked. He has never used smokeless tobacco. He reports current alcohol use. He reports that he does not use drugs.    Review of Systems:   12 systems are reviewed negative except for in HPI.    Meds:     Current Outpatient Medications:     multivitamin (ONE A DAY) per tablet, [MULTIVITAMIN (ONE A DAY) PER TABLET] Take 1 tablet by mouth daily., Disp: , Rfl:     colchicine (COLCYRS) 0.6 MG tablet, Take 1 tablet (0.6 mg) by mouth 2 times daily (Patient not taking: Reported on 5/11/2023), Disp: 60 tablet, Rfl: 0    indomethacin (INDOCIN) 50 MG capsule, Take 1 capsule (50 mg) by mouth 3 times daily (with meals) for 5 days, Disp: 15 capsule, Rfl: 3     Allergies:   Penicillins    Objective:      Physical Exam  94.3 kg (208 lb)  1.803 m (5' 11\")  Body mass index is 29.01 kg/m .  /74 (BP Location: Right arm, Patient Position: Sitting, Cuff Size: Adult Large)   Pulse 65   Resp 16   Ht 1.803 m (5' 11\")   Wt 94.3 kg (208 lb)   BMI 29.01 kg/m      General Appearance:   Awake, Alert, No acute distress.   HEENT:  Pupil equal, reactive to light. No scleral icterus; the mucous membranes were moist. No oral ulcers or thrush.    Neck: No cervical bruits. No JVD. No thyromegaly. No lymph node enlargement or tenderness.   Chest: The spine was straight. The chest was symmetric.   Lungs:   Respirations unlabored. Lungs are clear to auscultation. No crackles. No wheezing.   Cardiovascular:   RRR, normal first and second heart sounds with no murmurs. No rubs or gallops.    Abdomen:  Soft. No tenderness. Non-distended. Bowels sounds are present   Extremities: Equal posterior tibial pulses. No leg edema.   Skin: No rashes or ulcers. Warm, Dry.   Musculoskeletal: No tenderness. No deformity.   Neurologic: Mood and affect are appropriate. No focal deficits.         ICD interrogation today:  Personally reivewed  Normal device function  1 episode of nonsustained ventricular " tachycardia 9 beats at 198 bpm.    Cardiac Imaging Studies  ECHO on 7-:  Hypertrophic cardiomyopathy, mid septal variant. Hypertrophic cardiomyopathy, apical variant.   LEFT VENTRICLE:Normal left ventricular chamber size. Calculated 2-D linear left ventricular ejection fraction 69%. Global averaged left ventricular longitudinal peak systolic strain is abnormal at -10% (normal = more negative than -18%). Severe left   ventricular mid cavity obstruction. Large apical pouch identified with ultrasound enhancing agent. The thickest segment was measured at 22 mm. The thickest wall segment was the mid inferolateral segment. Regional wall motion abnormalities were present   (see wall motion graphics). Normal left ventricular filling pressure.   RIGHT VENTRICLE:Normal right ventricular chamber size. Normal right ventricular systolic function. Estimated right ventricular systolic pressure 27 mmHg (right atrial pressure of 5 mmHg).   ATRIA:Mildly enlarged left atrial size. Left atrial volume index 35 ml/m2. Strain imaging examination performed to assess left atrial function. Global averaged left atrial longitudinal peak systolic strain is abnormal at 26.0% (normal is greater than   35%). Enlarged right atrial size by visual estimate.   CARDIAC VALVES:Trileaflet aortic valve. Normal aortic valve. No aortic valve regurgitation. Normal mitral valve. Trivial mitral valve regurgitation. Normal pulmonary valve. Normal pulmonary valve systolic velocities. Trivial pulmonary valve   regurgitation. Normal tricuspid valve. Trivial tricuspid valve regurgitation.   OTHER ECHO FINDINGS:Normal inferior vena cava size with normal inspiratory collapse (>50%). Normal mid ascending aorta diameter of 29 mm. Upper limit of normal of the mid ascending aorta, for age, sex and BSA is 40 mm. No abdominal aortic aneurysm.   Normal abdominal aorta Doppler flow pattern. No atrial level shunt by color flow imaging. Tiny  pericardial effusion.      Lab Review   Lab Results   Component Value Date     09/15/2023    CO2 24 09/15/2023    CO2 29 08/19/2020    BUN 14.3 09/15/2023    BUN 12 08/19/2020     Lab Results   Component Value Date    WBC 4.1 09/15/2023    HGB 15.6 09/15/2023    HCT 45.6 09/15/2023    MCV 90 09/15/2023     09/15/2023     Lab Results   Component Value Date    CHOL 155 09/15/2023    TRIG 58 09/15/2023    HDL 49 09/15/2023    LDL 94 09/15/2023     02/25/2014     LDL Cholesterol Calculated   Date Value Ref Range Status   09/15/2023 94 <=100 mg/dL Final     LDL Cholesterol Direct   Date Value Ref Range Status   02/25/2014 118 <130 mg/dL Final

## 2023-12-12 LAB
MDC_IDC_LEAD_CONNECTION_STATUS: NORMAL
MDC_IDC_LEAD_IMPLANT_DT: NORMAL
MDC_IDC_LEAD_LOCATION: NORMAL
MDC_IDC_LEAD_LOCATION_DETAIL_1: NORMAL
MDC_IDC_LEAD_MFG: NORMAL
MDC_IDC_LEAD_MODEL: NORMAL
MDC_IDC_LEAD_POLARITY_TYPE: NORMAL
MDC_IDC_LEAD_SERIAL: NORMAL
MDC_IDC_MSMT_BATTERY_DTM: NORMAL
MDC_IDC_MSMT_BATTERY_REMAINING_LONGEVITY: 138 MO
MDC_IDC_MSMT_BATTERY_REMAINING_PERCENTAGE: 100 %
MDC_IDC_MSMT_BATTERY_STATUS: NORMAL
MDC_IDC_MSMT_LEADCHNL_RV_IMPEDANCE_VALUE: 463 OHM
MDC_IDC_MSMT_LEADCHNL_RV_PACING_THRESHOLD_AMPLITUDE: 0.5 V
MDC_IDC_MSMT_LEADCHNL_RV_PACING_THRESHOLD_PULSEWIDTH: 0.4 MS
MDC_IDC_MSMT_LEADCHNL_RV_SENSING_INTR_AMPL: 24.5 MV
MDC_IDC_PG_IMPLANT_DTM: NORMAL
MDC_IDC_PG_MFG: NORMAL
MDC_IDC_PG_MODEL: NORMAL
MDC_IDC_PG_SERIAL: NORMAL
MDC_IDC_PG_TYPE: NORMAL
MDC_IDC_SESS_CLINIC_NAME: NORMAL
MDC_IDC_SESS_DTM: NORMAL
MDC_IDC_SESS_TYPE: NORMAL
MDC_IDC_SET_BRADY_LOWRATE: 40 {BEATS}/MIN
MDC_IDC_SET_BRADY_MODE: NORMAL
MDC_IDC_SET_LEADCHNL_RV_PACING_AMPLITUDE: 1.5 V
MDC_IDC_SET_LEADCHNL_RV_PACING_POLARITY: NORMAL
MDC_IDC_SET_LEADCHNL_RV_PACING_PULSEWIDTH: 0.4 MS
MDC_IDC_SET_LEADCHNL_RV_SENSING_ADAPTATION_MODE: NORMAL
MDC_IDC_SET_LEADCHNL_RV_SENSING_POLARITY: NORMAL
MDC_IDC_SET_LEADCHNL_RV_SENSING_SENSITIVITY: 0.6 MV
MDC_IDC_SET_ZONE_DETECTION_INTERVAL: 300 MS
MDC_IDC_SET_ZONE_DETECTION_INTERVAL: 353 MS
MDC_IDC_SET_ZONE_STATUS: NORMAL
MDC_IDC_SET_ZONE_STATUS: NORMAL
MDC_IDC_SET_ZONE_TYPE: NORMAL
MDC_IDC_SET_ZONE_TYPE: NORMAL
MDC_IDC_SET_ZONE_VENDOR_TYPE: NORMAL
MDC_IDC_SET_ZONE_VENDOR_TYPE: NORMAL
MDC_IDC_STAT_BRADY_DTM_END: NORMAL
MDC_IDC_STAT_BRADY_DTM_START: NORMAL
MDC_IDC_STAT_BRADY_RV_PERCENT_PACED: 0 %
MDC_IDC_STAT_EPISODE_RECENT_COUNT: 0
MDC_IDC_STAT_EPISODE_RECENT_COUNT: 1
MDC_IDC_STAT_EPISODE_RECENT_COUNT_DTM_END: NORMAL
MDC_IDC_STAT_EPISODE_RECENT_COUNT_DTM_START: NORMAL
MDC_IDC_STAT_EPISODE_TYPE: NORMAL
MDC_IDC_STAT_EPISODE_VENDOR_TYPE: NORMAL
MDC_IDC_STAT_TACHYTHERAPY_ATP_DELIVERED_RECENT: 0
MDC_IDC_STAT_TACHYTHERAPY_ATP_DELIVERED_TOTAL: 0
MDC_IDC_STAT_TACHYTHERAPY_RECENT_DTM_END: NORMAL
MDC_IDC_STAT_TACHYTHERAPY_RECENT_DTM_START: NORMAL
MDC_IDC_STAT_TACHYTHERAPY_SHOCKS_ABORTED_RECENT: 0
MDC_IDC_STAT_TACHYTHERAPY_SHOCKS_ABORTED_TOTAL: 0
MDC_IDC_STAT_TACHYTHERAPY_SHOCKS_DELIVERED_RECENT: 0
MDC_IDC_STAT_TACHYTHERAPY_SHOCKS_DELIVERED_TOTAL: 0
MDC_IDC_STAT_TACHYTHERAPY_TOTAL_DTM_END: NORMAL
MDC_IDC_STAT_TACHYTHERAPY_TOTAL_DTM_START: NORMAL

## 2023-12-12 PROCEDURE — 93282 PRGRMG EVAL IMPLANTABLE DFB: CPT | Performed by: INTERNAL MEDICINE

## 2024-03-22 ENCOUNTER — ANCILLARY PROCEDURE (OUTPATIENT)
Dept: CARDIOLOGY | Facility: CLINIC | Age: 59
End: 2024-03-22
Attending: INTERNAL MEDICINE
Payer: COMMERCIAL

## 2024-03-22 DIAGNOSIS — Z95.810 ICD (IMPLANTABLE CARDIOVERTER-DEFIBRILLATOR) IN PLACE: ICD-10-CM

## 2024-03-22 DIAGNOSIS — I42.9 SECONDARY CARDIOMYOPATHY (H): ICD-10-CM

## 2024-03-22 LAB
MDC_IDC_EPISODE_DTM: NORMAL
MDC_IDC_EPISODE_DURATION: 12 S
MDC_IDC_EPISODE_DURATION: 13 S
MDC_IDC_EPISODE_DURATION: 14 S
MDC_IDC_EPISODE_DURATION: 15 S
MDC_IDC_EPISODE_ID: NORMAL
MDC_IDC_EPISODE_TYPE: NORMAL
MDC_IDC_EPISODE_TYPE_INDUCED: NO
MDC_IDC_LEAD_CONNECTION_STATUS: NORMAL
MDC_IDC_LEAD_IMPLANT_DT: NORMAL
MDC_IDC_LEAD_LOCATION: NORMAL
MDC_IDC_LEAD_LOCATION_DETAIL_1: NORMAL
MDC_IDC_LEAD_MFG: NORMAL
MDC_IDC_LEAD_MODEL: NORMAL
MDC_IDC_LEAD_POLARITY_TYPE: NORMAL
MDC_IDC_LEAD_SERIAL: NORMAL
MDC_IDC_MSMT_BATTERY_DTM: NORMAL
MDC_IDC_MSMT_BATTERY_REMAINING_LONGEVITY: 144 MO
MDC_IDC_MSMT_BATTERY_REMAINING_PERCENTAGE: 100 %
MDC_IDC_MSMT_BATTERY_STATUS: NORMAL
MDC_IDC_MSMT_CAP_CHARGE_DTM: NORMAL
MDC_IDC_MSMT_CAP_CHARGE_TIME: 9.9 S
MDC_IDC_MSMT_CAP_CHARGE_TYPE: NORMAL
MDC_IDC_MSMT_LEADCHNL_RV_IMPEDANCE_VALUE: 431 OHM
MDC_IDC_MSMT_LEADCHNL_RV_PACING_THRESHOLD_AMPLITUDE: 0.5 V
MDC_IDC_MSMT_LEADCHNL_RV_PACING_THRESHOLD_PULSEWIDTH: 0.4 MS
MDC_IDC_PG_IMPLANT_DTM: NORMAL
MDC_IDC_PG_MFG: NORMAL
MDC_IDC_PG_MODEL: NORMAL
MDC_IDC_PG_SERIAL: NORMAL
MDC_IDC_PG_TYPE: NORMAL
MDC_IDC_SESS_CLINIC_NAME: NORMAL
MDC_IDC_SESS_DTM: NORMAL
MDC_IDC_SESS_TYPE: NORMAL
MDC_IDC_SET_BRADY_LOWRATE: 40 {BEATS}/MIN
MDC_IDC_SET_BRADY_MODE: NORMAL
MDC_IDC_SET_LEADCHNL_RV_PACING_AMPLITUDE: 1.5 V
MDC_IDC_SET_LEADCHNL_RV_PACING_POLARITY: NORMAL
MDC_IDC_SET_LEADCHNL_RV_PACING_PULSEWIDTH: 0.4 MS
MDC_IDC_SET_LEADCHNL_RV_SENSING_ADAPTATION_MODE: NORMAL
MDC_IDC_SET_LEADCHNL_RV_SENSING_POLARITY: NORMAL
MDC_IDC_SET_LEADCHNL_RV_SENSING_SENSITIVITY: 0.6 MV
MDC_IDC_SET_ZONE_DETECTION_INTERVAL: 300 MS
MDC_IDC_SET_ZONE_DETECTION_INTERVAL: 353 MS
MDC_IDC_SET_ZONE_STATUS: NORMAL
MDC_IDC_SET_ZONE_STATUS: NORMAL
MDC_IDC_SET_ZONE_TYPE: NORMAL
MDC_IDC_SET_ZONE_TYPE: NORMAL
MDC_IDC_SET_ZONE_VENDOR_TYPE: NORMAL
MDC_IDC_SET_ZONE_VENDOR_TYPE: NORMAL
MDC_IDC_STAT_BRADY_DTM_END: NORMAL
MDC_IDC_STAT_BRADY_DTM_START: NORMAL
MDC_IDC_STAT_BRADY_RV_PERCENT_PACED: 0 %
MDC_IDC_STAT_EPISODE_RECENT_COUNT: 0
MDC_IDC_STAT_EPISODE_RECENT_COUNT: 4
MDC_IDC_STAT_EPISODE_RECENT_COUNT_DTM_END: NORMAL
MDC_IDC_STAT_EPISODE_RECENT_COUNT_DTM_START: NORMAL
MDC_IDC_STAT_EPISODE_TYPE: NORMAL
MDC_IDC_STAT_EPISODE_VENDOR_TYPE: NORMAL
MDC_IDC_STAT_TACHYTHERAPY_ATP_DELIVERED_RECENT: 0
MDC_IDC_STAT_TACHYTHERAPY_ATP_DELIVERED_TOTAL: 0
MDC_IDC_STAT_TACHYTHERAPY_RECENT_DTM_END: NORMAL
MDC_IDC_STAT_TACHYTHERAPY_RECENT_DTM_START: NORMAL
MDC_IDC_STAT_TACHYTHERAPY_SHOCKS_ABORTED_RECENT: 0
MDC_IDC_STAT_TACHYTHERAPY_SHOCKS_ABORTED_TOTAL: 0
MDC_IDC_STAT_TACHYTHERAPY_SHOCKS_DELIVERED_RECENT: 0
MDC_IDC_STAT_TACHYTHERAPY_SHOCKS_DELIVERED_TOTAL: 0
MDC_IDC_STAT_TACHYTHERAPY_TOTAL_DTM_END: NORMAL
MDC_IDC_STAT_TACHYTHERAPY_TOTAL_DTM_START: NORMAL

## 2024-03-22 PROCEDURE — 93296 REM INTERROG EVL PM/IDS: CPT | Performed by: INTERNAL MEDICINE

## 2024-03-22 PROCEDURE — 93295 DEV INTERROG REMOTE 1/2/MLT: CPT | Performed by: INTERNAL MEDICINE

## 2024-06-26 ENCOUNTER — ANCILLARY PROCEDURE (OUTPATIENT)
Dept: CARDIOLOGY | Facility: CLINIC | Age: 59
End: 2024-06-26
Attending: INTERNAL MEDICINE
Payer: COMMERCIAL

## 2024-06-26 VITALS
SYSTOLIC BLOOD PRESSURE: 102 MMHG | DIASTOLIC BLOOD PRESSURE: 66 MMHG | BODY MASS INDEX: 28.59 KG/M2 | WEIGHT: 205 LBS | RESPIRATION RATE: 14 BRPM | HEART RATE: 60 BPM

## 2024-06-26 DIAGNOSIS — Z95.810 ICD (IMPLANTABLE CARDIOVERTER-DEFIBRILLATOR) IN PLACE: ICD-10-CM

## 2024-06-26 DIAGNOSIS — I42.9 SECONDARY CARDIOMYOPATHY (H): ICD-10-CM

## 2024-06-26 DIAGNOSIS — I47.10 PAROXYSMAL SUPRAVENTRICULAR TACHYCARDIA (H): ICD-10-CM

## 2024-06-26 DIAGNOSIS — I42.2 HYPERTROPHIC CARDIOMYOPATHY (H): Primary | ICD-10-CM

## 2024-06-26 LAB
MDC_IDC_LEAD_CONNECTION_STATUS: NORMAL
MDC_IDC_LEAD_IMPLANT_DT: NORMAL
MDC_IDC_LEAD_LOCATION: NORMAL
MDC_IDC_LEAD_LOCATION_DETAIL_1: NORMAL
MDC_IDC_LEAD_MFG: NORMAL
MDC_IDC_LEAD_MODEL: NORMAL
MDC_IDC_LEAD_POLARITY_TYPE: NORMAL
MDC_IDC_LEAD_SERIAL: NORMAL
MDC_IDC_MSMT_BATTERY_DTM: NORMAL
MDC_IDC_MSMT_BATTERY_REMAINING_LONGEVITY: 138 MO
MDC_IDC_MSMT_BATTERY_REMAINING_PERCENTAGE: 100 %
MDC_IDC_MSMT_BATTERY_STATUS: NORMAL
MDC_IDC_MSMT_CAP_CHARGE_DTM: NORMAL
MDC_IDC_MSMT_CAP_CHARGE_TIME: 9.9 S
MDC_IDC_MSMT_CAP_CHARGE_TYPE: NORMAL
MDC_IDC_MSMT_LEADCHNL_RV_IMPEDANCE_VALUE: 474 OHM
MDC_IDC_MSMT_LEADCHNL_RV_PACING_THRESHOLD_AMPLITUDE: 0.4 V
MDC_IDC_MSMT_LEADCHNL_RV_PACING_THRESHOLD_PULSEWIDTH: 0.4 MS
MDC_IDC_MSMT_LEADCHNL_RV_SENSING_INTR_AMPL: 24.3 MV
MDC_IDC_PG_IMPLANT_DTM: NORMAL
MDC_IDC_PG_MFG: NORMAL
MDC_IDC_PG_MODEL: NORMAL
MDC_IDC_PG_SERIAL: NORMAL
MDC_IDC_PG_TYPE: NORMAL
MDC_IDC_SESS_CLINIC_NAME: NORMAL
MDC_IDC_SESS_DTM: NORMAL
MDC_IDC_SESS_TYPE: NORMAL
MDC_IDC_SET_BRADY_LOWRATE: 40 {BEATS}/MIN
MDC_IDC_SET_BRADY_MODE: NORMAL
MDC_IDC_SET_LEADCHNL_RV_PACING_AMPLITUDE: 1.5 V
MDC_IDC_SET_LEADCHNL_RV_PACING_POLARITY: NORMAL
MDC_IDC_SET_LEADCHNL_RV_PACING_PULSEWIDTH: 0.4 MS
MDC_IDC_SET_LEADCHNL_RV_SENSING_ADAPTATION_MODE: NORMAL
MDC_IDC_SET_LEADCHNL_RV_SENSING_POLARITY: NORMAL
MDC_IDC_SET_LEADCHNL_RV_SENSING_SENSITIVITY: 0.6 MV
MDC_IDC_SET_ZONE_DETECTION_INTERVAL: 300 MS
MDC_IDC_SET_ZONE_DETECTION_INTERVAL: 353 MS
MDC_IDC_SET_ZONE_STATUS: NORMAL
MDC_IDC_SET_ZONE_STATUS: NORMAL
MDC_IDC_SET_ZONE_TYPE: NORMAL
MDC_IDC_SET_ZONE_TYPE: NORMAL
MDC_IDC_SET_ZONE_VENDOR_TYPE: NORMAL
MDC_IDC_SET_ZONE_VENDOR_TYPE: NORMAL
MDC_IDC_STAT_BRADY_DTM_END: NORMAL
MDC_IDC_STAT_BRADY_DTM_START: NORMAL
MDC_IDC_STAT_BRADY_RV_PERCENT_PACED: 0 %
MDC_IDC_STAT_EPISODE_RECENT_COUNT: 0
MDC_IDC_STAT_EPISODE_RECENT_COUNT: 0
MDC_IDC_STAT_EPISODE_RECENT_COUNT: 1
MDC_IDC_STAT_EPISODE_RECENT_COUNT: 5
MDC_IDC_STAT_EPISODE_RECENT_COUNT_DTM_END: NORMAL
MDC_IDC_STAT_EPISODE_RECENT_COUNT_DTM_START: NORMAL
MDC_IDC_STAT_EPISODE_TYPE: NORMAL
MDC_IDC_STAT_EPISODE_VENDOR_TYPE: NORMAL
MDC_IDC_STAT_TACHYTHERAPY_ATP_DELIVERED_RECENT: 0
MDC_IDC_STAT_TACHYTHERAPY_ATP_DELIVERED_TOTAL: 0
MDC_IDC_STAT_TACHYTHERAPY_RECENT_DTM_END: NORMAL
MDC_IDC_STAT_TACHYTHERAPY_RECENT_DTM_START: NORMAL
MDC_IDC_STAT_TACHYTHERAPY_SHOCKS_ABORTED_RECENT: 0
MDC_IDC_STAT_TACHYTHERAPY_SHOCKS_ABORTED_TOTAL: 0
MDC_IDC_STAT_TACHYTHERAPY_SHOCKS_DELIVERED_RECENT: 0
MDC_IDC_STAT_TACHYTHERAPY_SHOCKS_DELIVERED_TOTAL: 0
MDC_IDC_STAT_TACHYTHERAPY_TOTAL_DTM_END: NORMAL
MDC_IDC_STAT_TACHYTHERAPY_TOTAL_DTM_START: NORMAL

## 2024-06-26 PROCEDURE — 99215 OFFICE O/P EST HI 40 MIN: CPT | Performed by: INTERNAL MEDICINE

## 2024-06-26 PROCEDURE — 93282 PRGRMG EVAL IMPLANTABLE DFB: CPT | Performed by: INTERNAL MEDICINE

## 2024-06-26 PROCEDURE — G2211 COMPLEX E/M VISIT ADD ON: HCPCS | Performed by: INTERNAL MEDICINE

## 2024-06-26 RX ORDER — METOPROLOL SUCCINATE 25 MG/1
25 TABLET, EXTENDED RELEASE ORAL DAILY
Qty: 30 TABLET | Refills: 11 | Status: SHIPPED | OUTPATIENT
Start: 2024-06-26

## 2024-06-26 RX ORDER — METOPROLOL SUCCINATE 25 MG/1
25 TABLET, EXTENDED RELEASE ORAL DAILY
Qty: 30 TABLET | Refills: 11 | Status: SHIPPED | OUTPATIENT
Start: 2024-06-26 | End: 2024-06-26

## 2024-06-26 NOTE — PROGRESS NOTES
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  Clinically he has no symptoms.  He had ICD placement for syncope without the clear etiology.  Continue to monitor.  His genetic test was inconclusive.  His first relatives were screened and negative for hypertrophic cardiomyopathy so far.  Paroxysmal nonsustained ventricular tachycardia, status post ICD placement: He had ICD interrogation today which was reported several episodes of nonsustained ventricular tachycardia, longest episode 19 beats at ventricular rate 206 bpm.  When compared to previous interrogation, nonsustained ventricular tachycardia lasted longer.  No ICD discharges.  We discussed her management.  After discussion, metoprolol succinate 25 mg daily is initiated.  Continue to monitor.  Dyslipidemia, obstructive sleep apnea: Lifestyle modification.    Thank you for the opportunity to be involved in the care of Gagan Mejía. If you have any questions, please feel free to contact me.  I will see the patient again in 12 months and as needed    Much or all of the text in this note was generated through the use of Dragon Dictate voice-to-text software. Errors in spelling or words which seem out of context are unintentional.   Sound alike errors, in particular, may have escaped editing.       History of Present Illness:   It is my pleasure to see Gagan Mejía at the [facility] Heart Care clinic for routine cardiology follow-up. Gagan Mejía is a 59 year old male with a medical history of hypertrophic cardiomyopathy combined mid septal and apical variant hypertrophic cardiomyopathy without obstruction, s/p ICD placement, nonsustained ventricular tachycardia, dyslipidemia, obstructive sleep apnea.    The patient states that today he has been doing pretty good.  He had no chest pain,  shortness of breath on exertion, palpitations, dizziness, orthopnea, PND or leg edema.  He had no syncopal episode over last year.  No ICD discharges.  He occasionally has fatigue.  His blood pressure and heart rate are in normal range.    Past Medical History:     Patient Active Problem List   Diagnosis    AC (acromioclavicular) arthritis    Acute idiopathic gout of left foot    Hypertrophic cardiomyopathy (H)    Elevated ALT measurement    Elevated AST (SGOT)    Erectile dysfunction    Herpes zoster without complication    History of herpes zoster    Hyperlipidemia    Musculoskeletal back pain    JENNIFER (obstructive sleep apnea)    Overweight (BMI 25.0-29.9)    ICD (implantable cardioverter-defibrillator) in place    Paroxysmal supraventricular tachycardia (H24)       Past Surgical History:     Past Surgical History:   Procedure Laterality Date    ARTHROSCOPY KNEE      ARTHROSCOPY SHOULDER ROTATOR CUFF REPAIR Right     LASIK         Family History:     Family History   Problem Relation Age of Onset    No Known Problems Mother     Syncope Father     No Known Problems Son     Sleep Apnea Brother     No Known Problems Brother     No Known Problems Brother     No Known Problems Sister     No Known Problems Sister        Social History:    reports that he has never smoked. He has never used smokeless tobacco. He reports current alcohol use. He reports that he does not use drugs.    Review of Systems:   12 systems are reviewed negative except for in HPI.    Meds:     Current Outpatient Medications:     colchicine (COLCYRS) 0.6 MG tablet, Take 1 tablet (0.6 mg) by mouth 2 times daily, Disp: 60 tablet, Rfl: 0    metoprolol succinate ER (TOPROL XL) 25 MG 24 hr tablet, Take 1 tablet (25 mg) by mouth daily, Disp: 30 tablet, Rfl: 11    multivitamin (ONE A DAY) per tablet, [MULTIVITAMIN (ONE A DAY) PER TABLET] Take 1 tablet by mouth daily., Disp: , Rfl:     indomethacin (INDOCIN) 50 MG capsule, Take 1 capsule (50 mg) by mouth 3  times daily (with meals) for 5 days, Disp: 15 capsule, Rfl: 3     Allergies:   Penicillins    Objective:      Physical Exam  93 kg (205 lb)     Body mass index is 28.59 kg/m .  /66 (BP Location: Right arm, Patient Position: Sitting, Cuff Size: Adult Large)   Pulse 60   Resp 14   Wt 93 kg (205 lb)   BMI 28.59 kg/m      General Appearance:   Awake, Alert, No acute distress.   HEENT:  Pupil equal, reactive to light. No scleral icterus; the mucous membranes were moist. No oral ulcers or thrush.    Neck: No cervical bruits. No JVD. No thyromegaly. No lymph node enlargement or tenderness.   Chest: The spine was straight. The chest was symmetric.   Lungs:   Respirations unlabored. Lungs are clear to auscultation. No crackles. No wheezing.   Cardiovascular:   RRR, normal first and second heart sounds with no murmurs. No rubs or gallops.    Abdomen:  Soft. No tenderness. Non-distended. Bowels sounds are present   Extremities: Equal posterior tibial pulses. No leg edema.   Skin: No rashes or ulcers. Warm, Dry.   Musculoskeletal: No tenderness. No deformity.   Neurologic: Mood and affect are appropriate. No focal deficits.         ICD interrogation today:  Personally reivewed  Normal device function  Several episode of nonsustained ventricular tachycardia longest episode 19 beats at 206 bpm.    Cardiac Imaging Studies  ECHO on 7-:  Hypertrophic cardiomyopathy, mid septal variant. Hypertrophic cardiomyopathy, apical variant.   LVEF 69%. Global averaged left ventricular longitudinal peak systolic strain is abnormal at -10% (normal = more negative than -18%).     Lab Review   Lab Results   Component Value Date     09/15/2023    CO2 24 09/15/2023    CO2 29 08/19/2020    BUN 14.3 09/15/2023    BUN 12 08/19/2020     Lab Results   Component Value Date    WBC 4.1 09/15/2023    HGB 15.6 09/15/2023    HCT 45.6 09/15/2023    MCV 90 09/15/2023     09/15/2023     Lab Results   Component Value Date    CHOL 155  09/15/2023    TRIG 58 09/15/2023    HDL 49 09/15/2023    LDL 94 09/15/2023     02/25/2014     LDL Cholesterol Calculated   Date Value Ref Range Status   09/15/2023 94 <=100 mg/dL Final     LDL Cholesterol Direct   Date Value Ref Range Status   02/25/2014 118 <130 mg/dL Final

## 2024-06-26 NOTE — LETTER
6/26/2024    Luciano Nicholas MD  [address]  [facility] [address]    RE: Gagan Mejía       Dear Colleague,     I had the pleasure of seeing Gagan Mejía in the [facility].      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  Clinically he has no symptoms.  He had ICD placement for syncope without the clear etiology.  Continue to monitor.  His genetic test was inconclusive.  His first relatives were screened and negative for hypertrophic cardiomyopathy so far.  Paroxysmal nonsustained ventricular tachycardia, status post ICD placement: He had ICD interrogation today which was reported several episodes of nonsustained ventricular tachycardia, longest episode 19 beats at ventricular rate 206 bpm.  When compared to previous interrogation, nonsustained ventricular tachycardia lasted longer.  No ICD discharges.  We discussed her management.  After discussion, metoprolol succinate 25 mg daily is initiated.  Continue to monitor.  Dyslipidemia, obstructive sleep apnea: Lifestyle modification.    Thank you for the opportunity to be involved in the care of Gagan Mejía. If you have any questions, please feel free to contact me.  I will see the patient again in 12 months and as needed    Much or all of the text in this note was generated through the use of Dragon Dictate voice-to-text software. Errors in spelling or words which seem out of context are unintentional.   Sound alike errors, in particular, may have escaped editing.       History of Present Illness:   It is my pleasure to see Gagan Mejía at the [facility] for routine cardiology follow-up. Gagan Mejía is a 59 year old male with a medical history of hypertrophic cardiomyopathy combined mid septal and apical variant hypertrophic cardiomyopathy  without obstruction, s/p ICD placement, nonsustained ventricular tachycardia, dyslipidemia, obstructive sleep apnea.    The patient states that today he has been doing pretty good.  He had no chest pain, shortness of breath on exertion, palpitations, dizziness, orthopnea, PND or leg edema.  He had no syncopal episode over last year.  No ICD discharges.  He occasionally has fatigue.  His blood pressure and heart rate are in normal range.    Past Medical History:     Patient Active Problem List   Diagnosis    AC (acromioclavicular) arthritis    Acute idiopathic gout of left foot    Hypertrophic cardiomyopathy (H)    Elevated ALT measurement    Elevated AST (SGOT)    Erectile dysfunction    Herpes zoster without complication    History of herpes zoster    Hyperlipidemia    Musculoskeletal back pain    JENNIFER (obstructive sleep apnea)    Overweight (BMI 25.0-29.9)    ICD (implantable cardioverter-defibrillator) in place    Paroxysmal supraventricular tachycardia (H24)       Past Surgical History:     Past Surgical History:   Procedure Laterality Date    ARTHROSCOPY KNEE      ARTHROSCOPY SHOULDER ROTATOR CUFF REPAIR Right     LASIK         Family History:     Family History   Problem Relation Age of Onset    No Known Problems Mother     Syncope Father     No Known Problems Son     Sleep Apnea Brother     No Known Problems Brother     No Known Problems Brother     No Known Problems Sister     No Known Problems Sister        Social History:    reports that he has never smoked. He has never used smokeless tobacco. He reports current alcohol use. He reports that he does not use drugs.    Review of Systems:   12 systems are reviewed negative except for in HPI.    Meds:     Current Outpatient Medications:     colchicine (COLCYRS) 0.6 MG tablet, Take 1 tablet (0.6 mg) by mouth 2 times daily, Disp: 60 tablet, Rfl: 0    metoprolol succinate ER (TOPROL XL) 25 MG 24 hr tablet, Take 1 tablet (25 mg) by mouth daily, Disp: 30 tablet,  Rfl: 11    multivitamin (ONE A DAY) per tablet, [MULTIVITAMIN (ONE A DAY) PER TABLET] Take 1 tablet by mouth daily., Disp: , Rfl:     indomethacin (INDOCIN) 50 MG capsule, Take 1 capsule (50 mg) by mouth 3 times daily (with meals) for 5 days, Disp: 15 capsule, Rfl: 3     Allergies:   Penicillins    Objective:      Physical Exam  93 kg (205 lb)     Body mass index is 28.59 kg/m .  /66 (BP Location: Right arm, Patient Position: Sitting, Cuff Size: Adult Large)   Pulse 60   Resp 14   Wt 93 kg (205 lb)   BMI 28.59 kg/m      General Appearance:   Awake, Alert, No acute distress.   HEENT:  Pupil equal, reactive to light. No scleral icterus; the mucous membranes were moist. No oral ulcers or thrush.    Neck: No cervical bruits. No JVD. No thyromegaly. No lymph node enlargement or tenderness.   Chest: The spine was straight. The chest was symmetric.   Lungs:   Respirations unlabored. Lungs are clear to auscultation. No crackles. No wheezing.   Cardiovascular:   RRR, normal first and second heart sounds with no murmurs. No rubs or gallops.    Abdomen:  Soft. No tenderness. Non-distended. Bowels sounds are present   Extremities: Equal posterior tibial pulses. No leg edema.   Skin: No rashes or ulcers. Warm, Dry.   Musculoskeletal: No tenderness. No deformity.   Neurologic: Mood and affect are appropriate. No focal deficits.         ICD interrogation today:  Personally reivewed  Normal device function  Several episode of nonsustained ventricular tachycardia longest episode 19 beats at 206 bpm.    Cardiac Imaging Studies  ECHO on 7-:  Hypertrophic cardiomyopathy, mid septal variant. Hypertrophic cardiomyopathy, apical variant.   LVEF 69%. Global averaged left ventricular longitudinal peak systolic strain is abnormal at -10% (normal = more negative than -18%).     Lab Review   Lab Results   Component Value Date     09/15/2023    CO2 24 09/15/2023    CO2 29 08/19/2020    BUN 14.3 09/15/2023    BUN 12  08/19/2020     Lab Results   Component Value Date    WBC 4.1 09/15/2023    HGB 15.6 09/15/2023    HCT 45.6 09/15/2023    MCV 90 09/15/2023     09/15/2023     Lab Results   Component Value Date    CHOL 155 09/15/2023    TRIG 58 09/15/2023    HDL 49 09/15/2023    LDL 94 09/15/2023     02/25/2014     LDL Cholesterol Calculated   Date Value Ref Range Status   09/15/2023 94 <=100 mg/dL Final     LDL Cholesterol Direct   Date Value Ref Range Status   02/25/2014 118 <130 mg/dL Final                     Thank you for allowing me to participate in the care of your patient.      Sincerely,     Shimon Reed MD     [facility]  cc:   Darin Post MD  [address] [place] [id number]

## 2024-09-27 ENCOUNTER — OFFICE VISIT (OUTPATIENT)
Dept: FAMILY MEDICINE | Facility: CLINIC | Age: 59
End: 2024-09-27
Attending: FAMILY MEDICINE
Payer: COMMERCIAL

## 2024-09-27 VITALS
DIASTOLIC BLOOD PRESSURE: 64 MMHG | WEIGHT: 206 LBS | TEMPERATURE: 98.1 F | RESPIRATION RATE: 13 BRPM | OXYGEN SATURATION: 97 % | HEIGHT: 71 IN | HEART RATE: 56 BPM | SYSTOLIC BLOOD PRESSURE: 101 MMHG | BODY MASS INDEX: 28.84 KG/M2

## 2024-09-27 DIAGNOSIS — M1A.9XX0 CHRONIC GOUT WITHOUT TOPHUS, UNSPECIFIED CAUSE, UNSPECIFIED SITE: ICD-10-CM

## 2024-09-27 DIAGNOSIS — Z95.810 ICD (IMPLANTABLE CARDIOVERTER-DEFIBRILLATOR) IN PLACE: ICD-10-CM

## 2024-09-27 DIAGNOSIS — R19.09 NODULE OF GROIN: ICD-10-CM

## 2024-09-27 DIAGNOSIS — D49.2 ATYPICAL SQUAMOPROLIFERATIVE SKIN LESION: ICD-10-CM

## 2024-09-27 DIAGNOSIS — E78.5 HYPERLIPIDEMIA, UNSPECIFIED HYPERLIPIDEMIA TYPE: ICD-10-CM

## 2024-09-27 DIAGNOSIS — R41.3 MEMORY LOSS: ICD-10-CM

## 2024-09-27 DIAGNOSIS — I47.10 PAROXYSMAL SUPRAVENTRICULAR TACHYCARDIA (H): ICD-10-CM

## 2024-09-27 DIAGNOSIS — E66.3 OVERWEIGHT: ICD-10-CM

## 2024-09-27 DIAGNOSIS — Z12.5 SCREENING FOR PROSTATE CANCER: ICD-10-CM

## 2024-09-27 DIAGNOSIS — D69.6 THROMBOCYTOPENIA (H): ICD-10-CM

## 2024-09-27 DIAGNOSIS — I42.2 HYPERTROPHIC CARDIOMYOPATHY (H): ICD-10-CM

## 2024-09-27 DIAGNOSIS — Z00.00 ROUTINE PHYSICAL EXAMINATION: Primary | ICD-10-CM

## 2024-09-27 LAB
ALBUMIN SERPL BCG-MCNC: 4.4 G/DL (ref 3.5–5.2)
ALP SERPL-CCNC: 59 U/L (ref 40–150)
ALT SERPL W P-5'-P-CCNC: 25 U/L (ref 0–70)
ANION GAP SERPL CALCULATED.3IONS-SCNC: 11 MMOL/L (ref 7–15)
AST SERPL W P-5'-P-CCNC: 39 U/L (ref 0–45)
BILIRUB SERPL-MCNC: 0.5 MG/DL
BUN SERPL-MCNC: 16.9 MG/DL (ref 6–20)
CALCIUM SERPL-MCNC: 9.1 MG/DL (ref 8.8–10.4)
CHLORIDE SERPL-SCNC: 106 MMOL/L (ref 98–107)
CHOLEST SERPL-MCNC: 184 MG/DL
CREAT SERPL-MCNC: 1.25 MG/DL (ref 0.67–1.17)
EGFRCR SERPLBLD CKD-EPI 2021: 67 ML/MIN/1.73M2
ERYTHROCYTE [DISTWIDTH] IN BLOOD BY AUTOMATED COUNT: 13.5 % (ref 10–15)
FASTING STATUS PATIENT QL REPORTED: ABNORMAL
FASTING STATUS PATIENT QL REPORTED: ABNORMAL
GLUCOSE SERPL-MCNC: 96 MG/DL (ref 70–99)
HCO3 SERPL-SCNC: 24 MMOL/L (ref 22–29)
HCT VFR BLD AUTO: 46.4 % (ref 40–53)
HDLC SERPL-MCNC: 42 MG/DL
HGB BLD-MCNC: 15.7 G/DL (ref 13.3–17.7)
LDLC SERPL CALC-MCNC: 130 MG/DL
MCH RBC QN AUTO: 29.7 PG (ref 26.5–33)
MCHC RBC AUTO-ENTMCNC: 33.8 G/DL (ref 31.5–36.5)
MCV RBC AUTO: 88 FL (ref 78–100)
NONHDLC SERPL-MCNC: 142 MG/DL
PLATELET # BLD AUTO: 134 10E3/UL (ref 150–450)
POTASSIUM SERPL-SCNC: 4.8 MMOL/L (ref 3.4–5.3)
PROT SERPL-MCNC: 6.4 G/DL (ref 6.4–8.3)
PSA SERPL DL<=0.01 NG/ML-MCNC: 3.27 NG/ML (ref 0–3.5)
RBC # BLD AUTO: 5.28 10E6/UL (ref 4.4–5.9)
SODIUM SERPL-SCNC: 141 MMOL/L (ref 135–145)
TRIGL SERPL-MCNC: 62 MG/DL
URATE SERPL-MCNC: 8.7 MG/DL (ref 3.4–7)
VIT B12 SERPL-MCNC: 447 PG/ML (ref 232–1245)
WBC # BLD AUTO: 4.7 10E3/UL (ref 4–11)

## 2024-09-27 PROCEDURE — 82607 VITAMIN B-12: CPT | Performed by: FAMILY MEDICINE

## 2024-09-27 PROCEDURE — 99214 OFFICE O/P EST MOD 30 MIN: CPT | Mod: 25 | Performed by: FAMILY MEDICINE

## 2024-09-27 PROCEDURE — 80061 LIPID PANEL: CPT | Performed by: FAMILY MEDICINE

## 2024-09-27 PROCEDURE — 84550 ASSAY OF BLOOD/URIC ACID: CPT | Performed by: FAMILY MEDICINE

## 2024-09-27 PROCEDURE — 85027 COMPLETE CBC AUTOMATED: CPT | Performed by: FAMILY MEDICINE

## 2024-09-27 PROCEDURE — 36415 COLL VENOUS BLD VENIPUNCTURE: CPT | Performed by: FAMILY MEDICINE

## 2024-09-27 PROCEDURE — 80053 COMPREHEN METABOLIC PANEL: CPT | Performed by: FAMILY MEDICINE

## 2024-09-27 PROCEDURE — G0103 PSA SCREENING: HCPCS | Performed by: FAMILY MEDICINE

## 2024-09-27 PROCEDURE — 99396 PREV VISIT EST AGE 40-64: CPT | Performed by: FAMILY MEDICINE

## 2024-09-27 RX ORDER — ALLOPURINOL 100 MG/1
100 TABLET ORAL DAILY
Qty: 90 TABLET | Refills: 3 | Status: SHIPPED | OUTPATIENT
Start: 2024-09-27

## 2024-09-27 ASSESSMENT — PAIN SCALES - GENERAL: PAINLEVEL: NO PAIN (0)

## 2024-09-27 NOTE — PROGRESS NOTES
Preventive Care Visit  [facility]  Luciano Nicholas MD, Family Medicine  Sep 27, 2024      Assessment & Plan     Routine physical examination  Routine healthcare maintenance.  Preventative cares reviewed.  Annual physical exams to continue.    Overweight  Overweight status reviewed.  Weight goal less than 200 pounds initially, less than 195 pounds ideally.    Hypertrophic cardiomyopathy (H)  Known history of hypertrophic cardiomyopathy, apical variant.  Follows with Dr. Reed, cardiologist.  - Comprehensive metabolic panel    Paroxysmal supraventricular tachycardia (H)  History of PSVT.  Now on metoprolol succinate 25 mg daily.  No recurrent concerns.  ICD in place.    ICD (implantable cardioverter-defibrillator) in place  As above.    Thrombocytopenia (H)  History of mild thrombocytopenia.  Update CBC to ensure stable or resolved.  - CBC with platelets  - Comprehensive metabolic panel    Hyperlipidemia, unspecified hyperlipidemia type  Lipid cascade updated today while fasting.  Weight goal less than 200 pounds initially, less than 195 pounds ideally.  - Lipid panel reflex to direct LDL Fasting    Screening for prostate cancer  PSA for prostate cancer screening purposes.  - Prostate Specific Antigen Screen    Chronic gout without tophus, unspecified cause, unspecified site  Initiate allopurinol 100 mg daily for both gout prophylaxis with uric acid goal less than 6.0.  Update uric acid and CBC.  Evidence for tophaceous gout involving IP joint of left great toe.  Has indomethacin or colchicine available for abortive therapy.  - allopurinol (ZYLOPRIM) 100 MG tablet  Dispense: 90 tablet; Refill: 3  - CBC with platelets  - Uric acid    Nodule of groin  Left groin nodule question lymph node versus other.  Ultrasound to be completed.  Consider biopsy if indicated.  - US Hernia Evaluation    Atypical squamoproliferative skin lesion  Atypical squamoproliferative skin lesion left lower eyelid which is new  "over past month.  Dermatology referral.  - Adult Dermatology  Referral    Memory loss  Word recall difficulty at times and will update vitamin B12 level.  - Vitamin B12              BMI  Estimated body mass index is 28.97 kg/m  as calculated from the following:    Height as of this encounter: 1.796 m (5' 10.71\").    Weight as of this encounter: 93.4 kg (206 lb).   Weight management plan: Discussed healthy diet and exercise guidelines    Counseling  Appropriate preventive services were addressed with this patient via screening, questionnaire, or discussion as appropriate for fall prevention, nutrition, physical activity, Tobacco-use cessation, social engagement, weight loss and cognition.  Checklist reviewing preventive services available has been given to the patient.  Reviewed patient's diet, addressing concerns and/or questions.   He is at risk for lack of exercise and has been provided with information to increase physical activity for the benefit of his well-being.   He is at risk for psychosocial distress and has been provided with information to reduce risk.           Sumit Borrego is a 59 year old, presenting for the following:  Physical (Bump on the head - toes gout - bump on forehead - memory recall - lump left groin neck super senitive )        9/27/2024     7:34 AM   Additional Questions   Roomed by [facility] Care Directive  Patient does not have a Health Care Directive or Living Will: Discussed advance care planning with patient; information given to patient to review.    HPI    Patient seen today for physical exam.  In general doing well.  Some difficulty with recall of specific words.  Hard to retrieve words like adverb vs. adjective as an example.  Can usually work around it.  Memory loss issues and family noted.  Patient with apical variant hypertrophic cardiomyopathy.  Has ICD in place.  Metoprolol succinate 25 mg daily recently started by his cardiologist with history of " "PSVT.  No recurrent concerns for syncopal episodes etc.  Has had mild JENNIFER historically but does not use CPAP.  Has had mild thrombocytopenia in the past with subsequent improvement.  Left great toe swelling chronic.  Had gout attack around state [place] time.  Tried indomethacin and took 2 pills and this seemed to cause upset stomach.  Has used colchicine in the past as well.  Has no lump in his left groin.  Described groin strains in the past.  No significant pain.  No fever.  No night sweats.  No other lymph node enlargement etc.  Does have atypical skin lesion left lower eyelid.  Has had area of easy bleeding involving vascular lesion involving right face and neck.  Likely lipoma as well involving forehead on the right side.  No drainage.  Comprehensive review of systems as above otherwise all negative.            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  Clinically he has no symptoms.  He had ICD placement for syncope without the clear etiology.  Continue to monitor.  His genetic test was inconclusive.  His first relatives were screened and negative for hypertrophic cardiomyopathy so far.  Paroxysmal nonsustained ventricular tachycardia, status post ICD placement: He had ICD interrogation today which was reported several episodes of nonsustained ventricular tachycardia, longest episode 19 beats at ventricular rate 206 bpm.  When compared to previous interrogation, nonsustained ventricular tachycardia lasted longer.  No ICD discharges.  We discussed her management.  After discussion, metoprolol succinate 25 mg daily is initiated.  Continue to monitor.  Dyslipidemia, obstructive sleep apnea: Lifestyle modification.       - \"Lolly\"   S.O. 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 Rossi); right shoulder arthroscopy 3/31/16 (Dr. Bales) for subacromial decompression and partial thickness tear repair of supraspinatus); Status post single-chamber ICD implanted 10/5/2023  Pneumonia LLL - 2005     CPAP (variable) ~ 8 (starts at 5 and goes up to 8-10)            9/23/2024   General Health   How would you rate your overall physical health? Good   Feel stress (tense, anxious, or unable to sleep) Only a little      (!) STRESS CONCERN      9/23/2024   Nutrition   Three or more servings of calcium each day? Yes   Diet: Regular (no restrictions)   How many servings of fruit and vegetables per day? (!) 2-3   How many sweetened beverages each day? 0-1            9/23/2024   Exercise   Days per week of moderate/strenous exercise 3 days   Average minutes spent exercising at this level 40 min            9/23/2024   Social Factors   Frequency of gathering with friends or relatives More than three times a week   Worry food won't last until get money to buy more No   Food not last or not have enough money for food? No   Do you have housing? (Housing is defined as stable permanent housing and does not include staying ouside in a car, in a tent, in an abandoned building, in an overnight shelter, or couch-surfing.) Yes   Are you worried about losing your housing? No   Lack of transportation? No   Unable to get utilities (heat,electricity)? No            9/23/2024   Fall Risk   Fallen 2 or more times in the past year? No   Trouble with walking or balance? No             9/23/2024   Dental   Dentist two times every year? Yes            9/23/2024   TB Screening   Were you born outside of the [place]? No            Today's PHQ-2 Score:       9/26/2024    10:15 AM   PHQ-2 ( 1999 [employer])   Q1: Little interest or pleasure in doing things 0   Q2: Feeling down, depressed or hopeless 0   PHQ-2 Score 0    Q1: Little interest or pleasure in doing things Not at all   Q2: Feeling down, depressed or hopeless Not at all   PHQ-2 Score 0           9/23/2024   Substance Use   Alcohol more than 3/day or more than 7/wk No   Do you use any other substances recreationally? No        Social History     Tobacco Use    Smoking status: Never    Smokeless tobacco: Never   Substance Use Topics    Alcohol use: Yes     Comment: Alcoholic Drinks/day: 1-2 times per week    Drug use: No           9/23/2024   STI Screening   New sexual partner(s) since last STI/HIV test? No      Last PSA:   Prostate Specific Antigen Screen   Date Value Ref Range Status   09/15/2023 3.49 0.00 - 3.50 ng/mL Final     ASCVD Risk   The 10-year ASCVD risk score (Sarath ATKINSON, et al., 2019) is: 3.5%    Values used to calculate the score:      Age: 59 years      Sex: Male      Is Non- : No      Diabetic: No      Tobacco smoker: No      Systolic Blood Pressure: 101 mmHg      Is BP treated: No      HDL Cholesterol: 49 mg/dL      Total Cholesterol: 155 mg/dL           Reviewed and updated as needed this visit by Provider                    Past Medical History:   Diagnosis Date    Apical variant hypertrophic cardiomyopathy (H) 4/3/2016    JENNIFER on CPAP 11/2015    Rotator cuff tear, right      Past Surgical History:   Procedure Laterality Date    ARTHROSCOPY KNEE      ARTHROSCOPY SHOULDER ROTATOR CUFF REPAIR Right     LASIK       Lab work is in process  Labs reviewed in EPIC  BP Readings from Last 3 Encounters:   09/27/24 101/64   06/26/24 102/66   12/11/23 102/74    Wt Readings from Last 3 Encounters:   09/27/24 93.4 kg (206 lb)   06/26/24 93 kg (205 lb)   12/11/23 94.3 kg (208 lb)                  Patient Active Problem List   Diagnosis    AC (acromioclavicular) arthritis    Acute idiopathic gout of left foot    Hypertrophic cardiomyopathy (H)    Elevated ALT measurement    Elevated AST (SGOT)    Erectile dysfunction    Herpes zoster  "without complication    History of herpes zoster    Hyperlipidemia    Musculoskeletal back pain    JENNIFER (obstructive sleep apnea)    Overweight (BMI 25.0-29.9)    ICD (implantable cardioverter-defibrillator) in place    Paroxysmal supraventricular tachycardia (H)     Past Surgical History:   Procedure Laterality Date    ARTHROSCOPY KNEE      ARTHROSCOPY SHOULDER ROTATOR CUFF REPAIR Right     LASIK         Social History     Tobacco Use    Smoking status: Never    Smokeless tobacco: Never   Substance Use Topics    Alcohol use: Yes     Comment: Alcoholic Drinks/day: 1-2 times per week     Family History   Problem Relation Age of Onset    No Known Problems Mother     Syncope Father     No Known Problems Son     Sleep Apnea Brother     No Known Problems Brother     No Known Problems Brother     No Known Problems Sister     No Known Problems Sister          Current Outpatient Medications   Medication Sig Dispense Refill    allopurinol (ZYLOPRIM) 100 MG tablet Take 1 tablet (100 mg) by mouth daily. 90 tablet 3    colchicine (COLCYRS) 0.6 MG tablet Take 1 tablet (0.6 mg) by mouth 2 times daily 60 tablet 0    metoprolol succinate ER (TOPROL XL) 25 MG 24 hr tablet Take 1 tablet (25 mg) by mouth daily 30 tablet 11    multivitamin (ONE A DAY) per tablet [MULTIVITAMIN (ONE A DAY) PER TABLET] Take 1 tablet by mouth daily.      indomethacin (INDOCIN) 50 MG capsule Take 1 capsule (50 mg) by mouth 3 times daily (with meals) for 5 days 15 capsule 3     Allergies   Allergen Reactions    Penicillins Hives         Review of Systems  Constitutional, HEENT, cardiovascular, pulmonary, GI, , musculoskeletal, neuro, skin, endocrine and psych systems are negative, except as otherwise noted.     Objective    Exam  /64 (BP Location: Right arm, Patient Position: Sitting, Cuff Size: Adult Regular)   Pulse 56   Temp 98.1  F (36.7  C) (Oral)   Resp 13   Ht 1.796 m (5' 10.71\")   Wt 93.4 kg (206 lb)   SpO2 97%   BMI 28.97 kg/m   " "  Estimated body mass index is 28.97 kg/m  as calculated from the following:    Height as of this encounter: 1.796 m (5' 10.71\").    Weight as of this encounter: 93.4 kg (206 lb).    Physical Exam    GENERAL: alert and no distress  EYES: Eyes grossly normal to inspection, PERRL and conjunctivae and sclerae normal  HENT: ear canals and TM's normal, nose and mouth without ulcers or lesions  NECK: no adenopathy, no asymmetry, masses, or scars  RESP: lungs clear to auscultation - no rales, rhonchi or wheezes  CV: regular rate and rhythm, normal S1 S2, no S3 or S4, no murmur, click or rub, no peripheral edema  ABDOMEN: soft, nontender, no hepatosplenomegaly, no masses and bowel sounds normal   (male): normal male genitalia without lesions or urethral discharge, no hernia  RECTAL: normal sphincter tone, no rectal masses, prostate normal size, smooth, nontender without nodules or masses  MS: no gross musculoskeletal defects noted, no edema  SKIN: no suspicious lesions or rashes.  Right forehead subcutaneous nodule likely lipoma.  Pilar cyst on scalp.  Vascular birthmark right face and neck.  Has 1 to 2 mm atypical squamoproliferative skin lesion left lower eyelid.  NEURO: Normal strength and tone, mentation intact and speech normal  PSYCH: mentation appears normal, affect normal/bright        Signed Electronically by: Luciano Nicholas MD    "

## 2024-10-02 ENCOUNTER — ANCILLARY PROCEDURE (OUTPATIENT)
Dept: CARDIOLOGY | Facility: CLINIC | Age: 59
End: 2024-10-02
Attending: INTERNAL MEDICINE
Payer: COMMERCIAL

## 2024-10-02 DIAGNOSIS — Z95.810 ICD (IMPLANTABLE CARDIOVERTER-DEFIBRILLATOR) IN PLACE: ICD-10-CM

## 2024-10-02 DIAGNOSIS — I42.9 SECONDARY CARDIOMYOPATHY (H): ICD-10-CM

## 2024-10-03 ENCOUNTER — HOSPITAL ENCOUNTER (OUTPATIENT)
Dept: ULTRASOUND IMAGING | Facility: HOSPITAL | Age: 59
Discharge: HOME | End: 2024-10-03
Attending: FAMILY MEDICINE | Admitting: FAMILY MEDICINE
Payer: COMMERCIAL

## 2024-10-03 DIAGNOSIS — R19.09 NODULE OF GROIN: ICD-10-CM

## 2024-10-03 PROCEDURE — 76705 ECHO EXAM OF ABDOMEN: CPT

## 2024-10-21 LAB
MDC_IDC_EPISODE_DTM: NORMAL
MDC_IDC_EPISODE_ID: NORMAL
MDC_IDC_EPISODE_TYPE: NORMAL
MDC_IDC_LEAD_CONNECTION_STATUS: NORMAL
MDC_IDC_LEAD_IMPLANT_DT: NORMAL
MDC_IDC_LEAD_LOCATION: NORMAL
MDC_IDC_LEAD_LOCATION_DETAIL_1: NORMAL
MDC_IDC_LEAD_MFG: NORMAL
MDC_IDC_LEAD_MODEL: NORMAL
MDC_IDC_LEAD_POLARITY_TYPE: NORMAL
MDC_IDC_LEAD_SERIAL: NORMAL
MDC_IDC_MSMT_BATTERY_DTM: NORMAL
MDC_IDC_MSMT_BATTERY_REMAINING_LONGEVITY: 144 MO
MDC_IDC_MSMT_BATTERY_REMAINING_PERCENTAGE: 100 %
MDC_IDC_MSMT_BATTERY_STATUS: NORMAL
MDC_IDC_MSMT_CAP_CHARGE_DTM: NORMAL
MDC_IDC_MSMT_CAP_CHARGE_TIME: 9.9 S
MDC_IDC_MSMT_CAP_CHARGE_TYPE: NORMAL
MDC_IDC_MSMT_LEADCHNL_RV_IMPEDANCE_VALUE: 434 OHM
MDC_IDC_PG_IMPLANT_DTM: NORMAL
MDC_IDC_PG_MFG: NORMAL
MDC_IDC_PG_MODEL: NORMAL
MDC_IDC_PG_SERIAL: NORMAL
MDC_IDC_PG_TYPE: NORMAL
MDC_IDC_SESS_CLINIC_NAME: NORMAL
MDC_IDC_SESS_DTM: NORMAL
MDC_IDC_SESS_TYPE: NORMAL
MDC_IDC_SET_BRADY_LOWRATE: 40 {BEATS}/MIN
MDC_IDC_SET_BRADY_MODE: NORMAL
MDC_IDC_SET_LEADCHNL_RV_PACING_AMPLITUDE: 1.5 V
MDC_IDC_SET_LEADCHNL_RV_PACING_POLARITY: NORMAL
MDC_IDC_SET_LEADCHNL_RV_PACING_PULSEWIDTH: 0.4 MS
MDC_IDC_SET_LEADCHNL_RV_SENSING_ADAPTATION_MODE: NORMAL
MDC_IDC_SET_LEADCHNL_RV_SENSING_POLARITY: NORMAL
MDC_IDC_SET_LEADCHNL_RV_SENSING_SENSITIVITY: 0.6 MV
MDC_IDC_SET_ZONE_DETECTION_INTERVAL: 300 MS
MDC_IDC_SET_ZONE_DETECTION_INTERVAL: 353 MS
MDC_IDC_SET_ZONE_STATUS: NORMAL
MDC_IDC_SET_ZONE_STATUS: NORMAL
MDC_IDC_SET_ZONE_TYPE: NORMAL
MDC_IDC_SET_ZONE_VENDOR_TYPE: NORMAL
MDC_IDC_SET_ZONE_VENDOR_TYPE: NORMAL
MDC_IDC_STAT_BRADY_DTM_END: NORMAL
MDC_IDC_STAT_BRADY_DTM_START: NORMAL
MDC_IDC_STAT_BRADY_RV_PERCENT_PACED: 0 %
MDC_IDC_STAT_EPISODE_RECENT_COUNT: 0
MDC_IDC_STAT_EPISODE_RECENT_COUNT_DTM_END: NORMAL
MDC_IDC_STAT_EPISODE_RECENT_COUNT_DTM_START: NORMAL
MDC_IDC_STAT_EPISODE_TYPE: NORMAL
MDC_IDC_STAT_EPISODE_VENDOR_TYPE: NORMAL
MDC_IDC_STAT_TACHYTHERAPY_ATP_DELIVERED_RECENT: 0
MDC_IDC_STAT_TACHYTHERAPY_ATP_DELIVERED_TOTAL: 0
MDC_IDC_STAT_TACHYTHERAPY_RECENT_DTM_END: NORMAL
MDC_IDC_STAT_TACHYTHERAPY_RECENT_DTM_START: NORMAL
MDC_IDC_STAT_TACHYTHERAPY_SHOCKS_ABORTED_RECENT: 0
MDC_IDC_STAT_TACHYTHERAPY_SHOCKS_ABORTED_TOTAL: 0
MDC_IDC_STAT_TACHYTHERAPY_SHOCKS_DELIVERED_RECENT: 0
MDC_IDC_STAT_TACHYTHERAPY_SHOCKS_DELIVERED_TOTAL: 0
MDC_IDC_STAT_TACHYTHERAPY_TOTAL_DTM_END: NORMAL
MDC_IDC_STAT_TACHYTHERAPY_TOTAL_DTM_START: NORMAL

## 2024-10-21 PROCEDURE — 93296 REM INTERROG EVL PM/IDS: CPT | Performed by: INTERNAL MEDICINE

## 2024-10-21 PROCEDURE — 93295 DEV INTERROG REMOTE 1/2/MLT: CPT | Performed by: INTERNAL MEDICINE

## 2024-12-17 DIAGNOSIS — R36.1 BLOOD IN SEMEN: Primary | ICD-10-CM

## 2024-12-26 ENCOUNTER — VIRTUAL VISIT (OUTPATIENT)
Dept: UROLOGY | Facility: CLINIC | Age: 59
End: 2024-12-26
Attending: FAMILY MEDICINE
Payer: COMMERCIAL

## 2024-12-26 ENCOUNTER — LAB (OUTPATIENT)
Dept: LAB | Facility: CLINIC | Age: 59
End: 2024-12-26
Payer: COMMERCIAL

## 2024-12-26 DIAGNOSIS — R36.1 BLOOD IN SEMEN: Primary | ICD-10-CM

## 2024-12-26 DIAGNOSIS — R36.1 BLOOD IN SEMEN: ICD-10-CM

## 2024-12-26 LAB
ALBUMIN UR-MCNC: NEGATIVE MG/DL
APPEARANCE UR: CLEAR
APTT PPP: 28 SECONDS (ref 22–38)
BILIRUB UR QL STRIP: NEGATIVE
COLOR UR AUTO: YELLOW
GLUCOSE UR STRIP-MCNC: NEGATIVE MG/DL
HCV AB SERPL QL IA: NONREACTIVE
HGB UR QL STRIP: NEGATIVE
INR PPP: 1.01 (ref 0.85–1.15)
KETONES UR STRIP-MCNC: NEGATIVE MG/DL
LEUKOCYTE ESTERASE UR QL STRIP: NEGATIVE
NITRATE UR QL: NEGATIVE
PH UR STRIP: 7 [PH] (ref 5–7)
SP GR UR STRIP: 1.01 (ref 1–1.03)
UROBILINOGEN UR STRIP-ACNC: 0.2 E.U./DL

## 2024-12-26 NOTE — PROGRESS NOTES
Virtual Visit Details    Type of service:  Video Visit     Originating Location (pt. Location): Home    Distant Location (provider location):  Off-site  Platform used for Video Visit: Serge

## 2024-12-26 NOTE — PROGRESS NOTES
UROLOGY CLINIC VISIT    Chief Complaint:  Hematospermia      Referring Provider:  Luciano Nicholas    Subjective:     Gagan Mejía is a 59 year old male with a PMHx significant for those items listed, who is presenting to clinic today to discuss hematospermia.    In November noted thick blood in the semen. Since then there have just been traces, then had recurrence but slightly less bloody. Since this last episode now has resolved.   No trauma injury or pain. No urinary infections. Had a viral illness in the lead.   No history of STI, no high risk behavior.   No hematuria of note. Not having any other symptoms to speak of, no pain.     PSA 3.27     PMH:Patient with HOCM, also with thrombocytopenia mild.   FH: No  family history of significance    Currently the patient has no fever, chills, nausea, vomiting or other signs/symptoms suggestive of acute systemic infection.    PMH  Past Medical History:   Diagnosis Date    Apical variant hypertrophic cardiomyopathy (H) 4/3/2016    JENNIFER on CPAP 11/2015    Rotator cuff tear, right      PSH  Past Surgical History:   Procedure Laterality Date    ARTHROSCOPY KNEE      ARTHROSCOPY SHOULDER ROTATOR CUFF REPAIR Right     LASIK       FAMHx  Family History   Problem Relation Age of Onset    No Known Problems Mother     Syncope Father     No Known Problems Son     Sleep Apnea Brother     No Known Problems Brother     No Known Problems Brother     No Known Problems Sister     No Known Problems Sister      ALLERGY  Allergies   Allergen Reactions    Penicillins Hives     MEDICATIONS  has a current medication list which includes the following prescription(s): allopurinol, colchicine, indomethacin, metoprolol succinate er, and multi vitamin.  ROS:  Constitutional: negative  Eyes: negative  Ears/Nose/Throat/Mouth: negative  Respiratory: negative  Cardiovascular: negative  Gastrointestinal: negative  Genito-Urinary: as documented above in HPI  Musculoskeletal: negative  Neurological:  "negative  Integumentary: negative      Objective:     There were no vitals taken for this visit.   General appearance: Healthy, in no apparent distress  Eyes: No conjunctival erythema or drainage.   Pulmonary: Unlabored breathing.  Abdomen: Prior scars from incisions noted: none   Skin: No rashes or ulcers visible  Neuro/Psych: Alert, oriented to person and place, appropriate affect        LABORATORY:  No results found for: \"CREATININE\"  Lab Results   Component Value Date    PSA 3.27 09/27/2024    PSA 3.49 09/15/2023         Assessment:       Gagan Mejía is a 59 year old male with a PMHx significant for those items listed, who is presenting to clinic today to discuss hematospermia.     Plan:   Blood in semen    - Although seems to be somewhat resolving plan for hematospermia workup   - UA/culture, STI testing. Last PSA wnl  - Coagulation studies, although with thrombocytopenia very mild unlikely that is major cause of spontaneous bleeding  - If resolved and labs normal can watch conservatively, if still having hematuria by next visit can consider MRI/Cysto/SA   - Discussed in detail the risks, benefits, alternatives and precautions. He voiced understanding of the plan and agrees to proceed.    ROV 3 months for reassessment     30 minutes spent on the date of the encounter doing (chart review/review of outside records/review of test results/interpretation of tests/reviewing imaging/patient visit/documentation/discussion with other provider(s)/discussion with family.    Maynor March MD         "

## 2024-12-26 NOTE — NURSING NOTE
Current patient location:  [place]    Is the patient currently in the state of [place]? YES    Visit mode:VIDEO    If the visit is dropped, the patient can be reconnected by:VIDEO VISIT: Text to cell phone:   Telephone Information:   Mobile [phone]       Will anyone else be joining the visit? NO  (If patient encounters technical issues they should call [phone])    Are changes needed to the allergy or medication list? No    Are refills needed on medications prescribed by this physician? NO    Rooming Documentation:  Questionnaire(s) completed    Reason for visit: Video Visit and Consult    Yesica DWYER

## 2024-12-30 LAB
M GENITALIUM DNA SPEC QL NAA+PROBE: NOT DETECTED
M HOMINIS DNA SPEC QL NAA+PROBE: NOT DETECTED
U PARVUM DNA SPEC QL NAA+PROBE: NOT DETECTED
U UREALYTICUM DNA SPEC QL NAA+PROBE: NOT DETECTED

## 2025-04-28 ENCOUNTER — ANCILLARY PROCEDURE (OUTPATIENT)
Dept: CARDIOLOGY | Facility: CLINIC | Age: 60
End: 2025-04-28
Attending: INTERNAL MEDICINE
Payer: COMMERCIAL

## 2025-04-28 DIAGNOSIS — I42.9 SECONDARY CARDIOMYOPATHY (H): ICD-10-CM

## 2025-04-28 DIAGNOSIS — Z95.810 ICD (IMPLANTABLE CARDIOVERTER-DEFIBRILLATOR) IN PLACE: ICD-10-CM

## 2025-04-28 LAB
MDC_IDC_EPISODE_DTM: NORMAL
MDC_IDC_EPISODE_ID: NORMAL
MDC_IDC_EPISODE_TYPE: NORMAL
MDC_IDC_LEAD_CONNECTION_STATUS: NORMAL
MDC_IDC_LEAD_IMPLANT_DT: NORMAL
MDC_IDC_LEAD_LOCATION: NORMAL
MDC_IDC_LEAD_LOCATION_DETAIL_1: NORMAL
MDC_IDC_LEAD_MFG: NORMAL
MDC_IDC_LEAD_MODEL: NORMAL
MDC_IDC_LEAD_POLARITY_TYPE: NORMAL
MDC_IDC_LEAD_SERIAL: NORMAL
MDC_IDC_MSMT_BATTERY_DTM: NORMAL
MDC_IDC_MSMT_BATTERY_REMAINING_LONGEVITY: 144 MO
MDC_IDC_MSMT_BATTERY_REMAINING_PERCENTAGE: 100 %
MDC_IDC_MSMT_BATTERY_STATUS: NORMAL
MDC_IDC_MSMT_CAP_CHARGE_DTM: NORMAL
MDC_IDC_MSMT_CAP_CHARGE_TIME: 10 S
MDC_IDC_MSMT_CAP_CHARGE_TYPE: NORMAL
MDC_IDC_MSMT_LEADCHNL_RV_IMPEDANCE_VALUE: 447 OHM
MDC_IDC_PG_IMPLANT_DTM: NORMAL
MDC_IDC_PG_MFG: NORMAL
MDC_IDC_PG_MODEL: NORMAL
MDC_IDC_PG_SERIAL: NORMAL
MDC_IDC_PG_TYPE: NORMAL
MDC_IDC_SESS_CLINIC_NAME: NORMAL
MDC_IDC_SESS_DTM: NORMAL
MDC_IDC_SESS_TYPE: NORMAL
MDC_IDC_SET_BRADY_LOWRATE: 40 {BEATS}/MIN
MDC_IDC_SET_BRADY_MODE: NORMAL
MDC_IDC_SET_LEADCHNL_RV_PACING_AMPLITUDE: 1.5 V
MDC_IDC_SET_LEADCHNL_RV_PACING_POLARITY: NORMAL
MDC_IDC_SET_LEADCHNL_RV_PACING_PULSEWIDTH: 0.4 MS
MDC_IDC_SET_LEADCHNL_RV_SENSING_ADAPTATION_MODE: NORMAL
MDC_IDC_SET_LEADCHNL_RV_SENSING_POLARITY: NORMAL
MDC_IDC_SET_LEADCHNL_RV_SENSING_SENSITIVITY: 0.6 MV
MDC_IDC_SET_ZONE_DETECTION_INTERVAL: 300 MS
MDC_IDC_SET_ZONE_DETECTION_INTERVAL: 353 MS
MDC_IDC_SET_ZONE_STATUS: NORMAL
MDC_IDC_SET_ZONE_STATUS: NORMAL
MDC_IDC_SET_ZONE_TYPE: NORMAL
MDC_IDC_SET_ZONE_VENDOR_TYPE: NORMAL
MDC_IDC_SET_ZONE_VENDOR_TYPE: NORMAL
MDC_IDC_STAT_BRADY_DTM_END: NORMAL
MDC_IDC_STAT_BRADY_DTM_START: NORMAL
MDC_IDC_STAT_BRADY_RV_PERCENT_PACED: 0 %
MDC_IDC_STAT_EPISODE_RECENT_COUNT: 0
MDC_IDC_STAT_EPISODE_RECENT_COUNT: 1
MDC_IDC_STAT_EPISODE_RECENT_COUNT_DTM_END: NORMAL
MDC_IDC_STAT_EPISODE_RECENT_COUNT_DTM_START: NORMAL
MDC_IDC_STAT_EPISODE_TYPE: NORMAL
MDC_IDC_STAT_EPISODE_VENDOR_TYPE: NORMAL
MDC_IDC_STAT_TACHYTHERAPY_ATP_DELIVERED_RECENT: 0
MDC_IDC_STAT_TACHYTHERAPY_ATP_DELIVERED_TOTAL: 0
MDC_IDC_STAT_TACHYTHERAPY_RECENT_DTM_END: NORMAL
MDC_IDC_STAT_TACHYTHERAPY_RECENT_DTM_START: NORMAL
MDC_IDC_STAT_TACHYTHERAPY_SHOCKS_ABORTED_RECENT: 0
MDC_IDC_STAT_TACHYTHERAPY_SHOCKS_ABORTED_TOTAL: 0
MDC_IDC_STAT_TACHYTHERAPY_SHOCKS_DELIVERED_RECENT: 0
MDC_IDC_STAT_TACHYTHERAPY_SHOCKS_DELIVERED_TOTAL: 0
MDC_IDC_STAT_TACHYTHERAPY_TOTAL_DTM_END: NORMAL
MDC_IDC_STAT_TACHYTHERAPY_TOTAL_DTM_START: NORMAL

## 2025-05-12 PROCEDURE — 93296 REM INTERROG EVL PM/IDS: CPT | Performed by: INTERNAL MEDICINE

## 2025-05-12 PROCEDURE — 93295 DEV INTERROG REMOTE 1/2/MLT: CPT | Performed by: INTERNAL MEDICINE

## 2025-06-20 ENCOUNTER — ANCILLARY PROCEDURE (OUTPATIENT)
Dept: CARDIOLOGY | Facility: CLINIC | Age: 60
End: 2025-06-20
Attending: INTERNAL MEDICINE
Payer: COMMERCIAL

## 2025-06-20 DIAGNOSIS — Z95.810 ICD (IMPLANTABLE CARDIOVERTER-DEFIBRILLATOR) IN PLACE: Primary | ICD-10-CM

## 2025-06-20 DIAGNOSIS — I42.9 SECONDARY CARDIOMYOPATHY (H): ICD-10-CM

## 2025-06-21 LAB
MDC_IDC_LEAD_CONNECTION_STATUS: NORMAL
MDC_IDC_LEAD_IMPLANT_DT: NORMAL
MDC_IDC_LEAD_LOCATION: NORMAL
MDC_IDC_LEAD_LOCATION_DETAIL_1: NORMAL
MDC_IDC_LEAD_MFG: NORMAL
MDC_IDC_LEAD_MODEL: NORMAL
MDC_IDC_LEAD_POLARITY_TYPE: NORMAL
MDC_IDC_LEAD_SERIAL: NORMAL
MDC_IDC_MSMT_BATTERY_DTM: NORMAL
MDC_IDC_MSMT_BATTERY_REMAINING_LONGEVITY: 138 MO
MDC_IDC_MSMT_BATTERY_REMAINING_PERCENTAGE: 100 %
MDC_IDC_MSMT_BATTERY_STATUS: NORMAL
MDC_IDC_MSMT_CAP_CHARGE_DTM: NORMAL
MDC_IDC_MSMT_CAP_CHARGE_TIME: 10 S
MDC_IDC_MSMT_CAP_CHARGE_TYPE: NORMAL
MDC_IDC_MSMT_LEADCHNL_RV_IMPEDANCE_VALUE: 436 OHM
MDC_IDC_MSMT_LEADCHNL_RV_IMPEDANCE_VALUE: 436 OHM
MDC_IDC_MSMT_LEADCHNL_RV_PACING_THRESHOLD_AMPLITUDE: 0.5 V
MDC_IDC_MSMT_LEADCHNL_RV_PACING_THRESHOLD_AMPLITUDE: 0.5 V
MDC_IDC_MSMT_LEADCHNL_RV_PACING_THRESHOLD_PULSEWIDTH: 0.4 MS
MDC_IDC_MSMT_LEADCHNL_RV_PACING_THRESHOLD_PULSEWIDTH: 0.4 MS
MDC_IDC_MSMT_LEADCHNL_RV_SENSING_INTR_AMPL: 21.9 MV
MDC_IDC_PG_IMPLANT_DTM: NORMAL
MDC_IDC_PG_MFG: NORMAL
MDC_IDC_PG_MODEL: NORMAL
MDC_IDC_PG_SERIAL: NORMAL
MDC_IDC_PG_TYPE: NORMAL
MDC_IDC_SESS_CLINIC_NAME: NORMAL
MDC_IDC_SESS_DTM: NORMAL
MDC_IDC_SESS_TYPE: NORMAL
MDC_IDC_SET_BRADY_LOWRATE: 40 {BEATS}/MIN
MDC_IDC_SET_BRADY_MODE: NORMAL
MDC_IDC_SET_LEADCHNL_RV_PACING_AMPLITUDE: 1.5 V
MDC_IDC_SET_LEADCHNL_RV_PACING_POLARITY: NORMAL
MDC_IDC_SET_LEADCHNL_RV_PACING_PULSEWIDTH: 0.4 MS
MDC_IDC_SET_LEADCHNL_RV_SENSING_ADAPTATION_MODE: NORMAL
MDC_IDC_SET_LEADCHNL_RV_SENSING_POLARITY: NORMAL
MDC_IDC_SET_LEADCHNL_RV_SENSING_SENSITIVITY: 0.6 MV
MDC_IDC_SET_ZONE_DETECTION_INTERVAL: 300 MS
MDC_IDC_SET_ZONE_DETECTION_INTERVAL: 353 MS
MDC_IDC_SET_ZONE_STATUS: NORMAL
MDC_IDC_SET_ZONE_STATUS: NORMAL
MDC_IDC_SET_ZONE_TYPE: NORMAL
MDC_IDC_SET_ZONE_TYPE: NORMAL
MDC_IDC_SET_ZONE_VENDOR_TYPE: NORMAL
MDC_IDC_SET_ZONE_VENDOR_TYPE: NORMAL
MDC_IDC_STAT_BRADY_DTM_END: NORMAL
MDC_IDC_STAT_BRADY_DTM_START: NORMAL
MDC_IDC_STAT_BRADY_RV_PERCENT_PACED: 0 %
MDC_IDC_STAT_EPISODE_RECENT_COUNT: 0
MDC_IDC_STAT_EPISODE_RECENT_COUNT: 2
MDC_IDC_STAT_EPISODE_RECENT_COUNT_DTM_END: NORMAL
MDC_IDC_STAT_EPISODE_RECENT_COUNT_DTM_START: NORMAL
MDC_IDC_STAT_EPISODE_TYPE: NORMAL
MDC_IDC_STAT_EPISODE_VENDOR_TYPE: NORMAL
MDC_IDC_STAT_TACHYTHERAPY_ATP_DELIVERED_RECENT: 0
MDC_IDC_STAT_TACHYTHERAPY_ATP_DELIVERED_TOTAL: 0
MDC_IDC_STAT_TACHYTHERAPY_RECENT_DTM_END: NORMAL
MDC_IDC_STAT_TACHYTHERAPY_RECENT_DTM_START: NORMAL
MDC_IDC_STAT_TACHYTHERAPY_SHOCKS_ABORTED_RECENT: 0
MDC_IDC_STAT_TACHYTHERAPY_SHOCKS_ABORTED_TOTAL: 0
MDC_IDC_STAT_TACHYTHERAPY_SHOCKS_DELIVERED_RECENT: 0
MDC_IDC_STAT_TACHYTHERAPY_SHOCKS_DELIVERED_TOTAL: 0
MDC_IDC_STAT_TACHYTHERAPY_TOTAL_DTM_END: NORMAL
MDC_IDC_STAT_TACHYTHERAPY_TOTAL_DTM_START: NORMAL

## 2025-06-21 PROCEDURE — 93282 PRGRMG EVAL IMPLANTABLE DFB: CPT | Performed by: INTERNAL MEDICINE

## 2025-07-08 ENCOUNTER — HOSPITAL ENCOUNTER (OUTPATIENT)
Dept: CARDIOLOGY | Facility: HOSPITAL | Age: 60
Discharge: HOME | End: 2025-07-08
Attending: INTERNAL MEDICINE
Payer: COMMERCIAL

## 2025-07-08 DIAGNOSIS — I42.2 HYPERTROPHIC CARDIOMYOPATHY (H): ICD-10-CM

## 2025-07-08 PROCEDURE — 93306 TTE W/DOPPLER COMPLETE: CPT | Mod: 26 | Performed by: INTERNAL MEDICINE

## 2025-07-08 PROCEDURE — 93356 MYOCRD STRAIN IMG SPCKL TRCK: CPT | Performed by: INTERNAL MEDICINE

## 2025-07-08 PROCEDURE — 255N000002 HC RX 255 OP 636: Performed by: INTERNAL MEDICINE

## 2025-07-08 PROCEDURE — 93356 MYOCRD STRAIN IMG SPCKL TRCK: CPT

## 2025-07-08 RX ADMIN — PERFLUTREN 3 ML: 6.52 INJECTION, SUSPENSION INTRAVENOUS at 11:39

## 2025-08-07 ENCOUNTER — MYC MEDICAL ADVICE (OUTPATIENT)
Dept: FAMILY MEDICINE | Facility: CLINIC | Age: 60
End: 2025-08-07

## 2025-08-07 DIAGNOSIS — G47.33 OSA (OBSTRUCTIVE SLEEP APNEA): Primary | ICD-10-CM

## 2025-08-14 ENCOUNTER — VIRTUAL VISIT (OUTPATIENT)
Dept: FAMILY MEDICINE | Facility: CLINIC | Age: 60
End: 2025-08-14
Payer: COMMERCIAL

## 2025-08-14 DIAGNOSIS — G47.33 OSA ON CPAP: Primary | ICD-10-CM

## 2025-08-28 ENCOUNTER — PATIENT OUTREACH (OUTPATIENT)
Dept: CARE COORDINATION | Facility: CLINIC | Age: 60
End: 2025-08-28
Payer: COMMERCIAL